# Patient Record
Sex: MALE | Race: WHITE | Employment: OTHER | URBAN - METROPOLITAN AREA
[De-identification: names, ages, dates, MRNs, and addresses within clinical notes are randomized per-mention and may not be internally consistent; named-entity substitution may affect disease eponyms.]

---

## 2023-02-24 PROBLEM — M48.061 LUMBAR STENOSIS: Status: ACTIVE | Noted: 2023-02-24

## 2023-02-24 PROBLEM — M47.816 LUMBAR SPONDYLOSIS: Status: ACTIVE | Noted: 2023-02-24

## 2023-02-24 PROBLEM — E78.5 HYPERLIPIDEMIA: Status: ACTIVE | Noted: 2023-02-24

## 2023-02-24 PROBLEM — I25.10 ARTERIOSCLEROTIC HEART DISEASE: Status: ACTIVE | Noted: 2023-02-24

## 2023-02-24 PROBLEM — M54.50 LUMBAR PAIN: Status: ACTIVE | Noted: 2023-02-24

## 2023-02-24 PROBLEM — H90.3 BILATERAL SENSORINEURAL HEARING LOSS: Status: ACTIVE | Noted: 2023-02-24

## 2023-02-24 PROBLEM — S32.000A LUMBAR COMPRESSION FRACTURE (MULTI): Status: ACTIVE | Noted: 2023-02-24

## 2023-02-24 PROBLEM — D12.6 TUBULAR ADENOMA OF COLON: Status: ACTIVE | Noted: 2023-02-24

## 2023-02-24 PROBLEM — H10.9 CONJUNCTIVITIS: Status: ACTIVE | Noted: 2023-02-24

## 2023-02-24 PROBLEM — N28.1 RENAL CYST: Status: ACTIVE | Noted: 2023-02-24

## 2023-02-24 PROBLEM — Z98.61 CAD S/P PERCUTANEOUS CORONARY ANGIOPLASTY: Status: ACTIVE | Noted: 2023-02-24

## 2023-02-24 PROBLEM — N40.1 BPH WITH OBSTRUCTION/LOWER URINARY TRACT SYMPTOMS: Status: ACTIVE | Noted: 2023-02-24

## 2023-02-24 PROBLEM — N13.8 BPH WITH OBSTRUCTION/LOWER URINARY TRACT SYMPTOMS: Status: ACTIVE | Noted: 2023-02-24

## 2023-02-24 PROBLEM — I65.29 CAROTID ARTERY PLAQUE: Status: ACTIVE | Noted: 2023-02-24

## 2023-02-24 PROBLEM — R42 ORTHOSTATIC DIZZINESS: Status: ACTIVE | Noted: 2023-02-24

## 2023-02-24 PROBLEM — M50.90 CERVICAL DISC DISEASE: Status: ACTIVE | Noted: 2023-02-24

## 2023-02-24 PROBLEM — I10 BENIGN ESSENTIAL HYPERTENSION: Status: ACTIVE | Noted: 2023-02-24

## 2023-02-24 PROBLEM — Z95.5 H/O HEART ARTERY STENT: Status: ACTIVE | Noted: 2023-02-24

## 2023-02-24 PROBLEM — Q44.1 GALLBLADDER ANOMALY: Status: ACTIVE | Noted: 2023-02-24

## 2023-02-24 PROBLEM — R29.898 LOWER EXTREMITY WEAKNESS: Status: ACTIVE | Noted: 2023-02-24

## 2023-02-24 PROBLEM — M81.0 OSTEOPOROSIS, SENILE: Status: ACTIVE | Noted: 2023-02-24

## 2023-02-24 PROBLEM — R53.1 LEFT-SIDED WEAKNESS: Status: ACTIVE | Noted: 2023-02-24

## 2023-02-24 PROBLEM — R91.1 LUNG NODULE: Status: ACTIVE | Noted: 2023-02-24

## 2023-02-24 PROBLEM — G45.9 TIA (TRANSIENT ISCHEMIC ATTACK): Status: ACTIVE | Noted: 2023-02-24

## 2023-02-24 PROBLEM — E66.3 OVERWEIGHT WITH BODY MASS INDEX (BMI) OF 26 TO 26.9 IN ADULT: Status: ACTIVE | Noted: 2023-02-24

## 2023-02-24 RX ORDER — ASPIRIN 81 MG/1
1 TABLET ORAL DAILY
COMMUNITY
Start: 2013-04-18

## 2023-02-24 RX ORDER — MULTIVIT-MIN/FA/LYCOPEN/LUTEIN .4-300-25
TABLET ORAL
COMMUNITY

## 2023-02-24 RX ORDER — ATORVASTATIN CALCIUM 40 MG/1
1 TABLET, FILM COATED ORAL NIGHTLY
COMMUNITY
Start: 2013-04-18 | End: 2023-06-06 | Stop reason: SDUPTHER

## 2023-03-29 ENCOUNTER — APPOINTMENT (OUTPATIENT)
Dept: PRIMARY CARE | Facility: CLINIC | Age: 88
End: 2023-03-29
Payer: MEDICARE

## 2023-03-30 ENCOUNTER — OFFICE VISIT (OUTPATIENT)
Dept: PRIMARY CARE | Facility: CLINIC | Age: 88
End: 2023-03-30
Payer: MEDICARE

## 2023-03-30 VITALS
HEART RATE: 67 BPM | OXYGEN SATURATION: 98 % | DIASTOLIC BLOOD PRESSURE: 70 MMHG | TEMPERATURE: 98.2 F | RESPIRATION RATE: 16 BRPM | SYSTOLIC BLOOD PRESSURE: 128 MMHG

## 2023-03-30 DIAGNOSIS — S32.000S COMPRESSION FRACTURE OF LUMBAR VERTEBRA, UNSPECIFIED LUMBAR VERTEBRAL LEVEL, SEQUELA: ICD-10-CM

## 2023-03-30 DIAGNOSIS — I10 BENIGN ESSENTIAL HYPERTENSION: ICD-10-CM

## 2023-03-30 DIAGNOSIS — E78.2 MIXED HYPERLIPIDEMIA: ICD-10-CM

## 2023-03-30 DIAGNOSIS — M48.061 SPINAL STENOSIS OF LUMBAR REGION, UNSPECIFIED WHETHER NEUROGENIC CLAUDICATION PRESENT: Primary | ICD-10-CM

## 2023-03-30 DIAGNOSIS — G45.9 TIA (TRANSIENT ISCHEMIC ATTACK): ICD-10-CM

## 2023-03-30 PROBLEM — M81.0 OSTEOPOROSIS, SENILE: Status: RESOLVED | Noted: 2023-02-24 | Resolved: 2023-03-30

## 2023-03-30 PROBLEM — H10.9 CONJUNCTIVITIS: Status: RESOLVED | Noted: 2023-02-24 | Resolved: 2023-03-30

## 2023-03-30 PROBLEM — R42 ORTHOSTATIC DIZZINESS: Status: RESOLVED | Noted: 2023-02-24 | Resolved: 2023-03-30

## 2023-03-30 PROCEDURE — 3078F DIAST BP <80 MM HG: CPT | Performed by: INTERNAL MEDICINE

## 2023-03-30 PROCEDURE — 1160F RVW MEDS BY RX/DR IN RCRD: CPT | Performed by: INTERNAL MEDICINE

## 2023-03-30 PROCEDURE — 1036F TOBACCO NON-USER: CPT | Performed by: INTERNAL MEDICINE

## 2023-03-30 PROCEDURE — 99214 OFFICE O/P EST MOD 30 MIN: CPT | Performed by: INTERNAL MEDICINE

## 2023-03-30 PROCEDURE — 1159F MED LIST DOCD IN RCRD: CPT | Performed by: INTERNAL MEDICINE

## 2023-03-30 PROCEDURE — 1170F FXNL STATUS ASSESSED: CPT | Performed by: INTERNAL MEDICINE

## 2023-03-30 PROCEDURE — 3074F SYST BP LT 130 MM HG: CPT | Performed by: INTERNAL MEDICINE

## 2023-03-30 ASSESSMENT — ENCOUNTER SYMPTOMS
ABDOMINAL PAIN: 0
WEAKNESS: 1
DIARRHEA: 0
BACK PAIN: 1
BLOOD IN STOOL: 0
CONSTIPATION: 1

## 2023-03-30 ASSESSMENT — ACTIVITIES OF DAILY LIVING (ADL)
DRESSING: INDEPENDENT
TAKING_MEDICATION: INDEPENDENT
MANAGING_FINANCES: INDEPENDENT
GROCERY_SHOPPING: NEEDS ASSISTANCE
DOING_HOUSEWORK: INDEPENDENT
BATHING: INDEPENDENT

## 2023-03-30 ASSESSMENT — PATIENT HEALTH QUESTIONNAIRE - PHQ9
1. LITTLE INTEREST OR PLEASURE IN DOING THINGS: NOT AT ALL
SUM OF ALL RESPONSES TO PHQ9 QUESTIONS 1 AND 2: 0
2. FEELING DOWN, DEPRESSED OR HOPELESS: NOT AT ALL

## 2023-03-30 NOTE — PROGRESS NOTES
Patient here for a Medicare Wellness visit and follow up    Subjective   Patient ID: Ant Seaman is a 88 y.o. male who presents for Medicare Annual Wellness Visit Subsequent and Follow-up.    The patient completed a MOHS procedure on 3/27/2023 and follows with  from [facility].     The patient reports ongoing back pain due to lumbar stenosis which is minimally to partially responsive to physical therapy.  He has declined surgical repair due to the extent of the procedure.  Although the pain is bothersome, he notes that the most limiting symptom is the associated lower limb weakness.  He is quite worried that he may eventually lose the use of his legs, and is considering the suggested corticosteroid injections in the lumbar area.  He uses a walker for ambulation and alternates Tylenol and Advil every 6 hours for pain control.  .    The patient notes some double vision following a TIA in 2018 which is well corrected with prescription lenses.  He follows with  from Ophthalmology, and recently underwent cataract surgery as well.      The patient has been taking laxatives over the counter since the fall of 2022 every two to three days for a bowel movement.  He denies any abdominal pain, hematochezia, or melena.  He uses a generic [employer] Stimulative Laxative.      The patient denies any chest pain or chest pressure.      Review of Systems   Eyes:  Positive for visual disturbance.   Cardiovascular:  Negative for chest pain.   Gastrointestinal:  Positive for constipation. Negative for abdominal pain, blood in stool and diarrhea.   Musculoskeletal:  Positive for back pain.   Neurological:  Positive for weakness.       Objective   Physical Exam  Constitutional:       Appearance: Normal appearance.   Cardiovascular:      Rate and Rhythm: Normal rate and regular rhythm.      Heart sounds: Normal heart sounds.   Pulmonary:      Effort: Pulmonary effort is normal.      Breath sounds: Normal breath  sounds.   Abdominal:      General: Bowel sounds are normal.      Palpations: Abdomen is soft.      Tenderness: There is no abdominal tenderness.   Skin:     General: Skin is warm and dry.   Neurological:      General: No focal deficit present.      Mental Status: He is alert and oriented to person, place, and time. Mental status is at baseline.   Psychiatric:         Mood and Affect: Mood normal.         Behavior: Behavior normal.       Assessment/Plan   Problem List Items Addressed This Visit          Nervous    Lumbar stenosis - Primary       Circulatory    Benign essential hypertension    TIA (transient ischemic attack)       Musculoskeletal    Lumbar compression fracture (CMS/HCC)       Other    Hyperlipidemia       Medicare Wellness Examination Done  -  Discussed healthy diet and regular exercise.    -  Physical exam overall unremarkable. Immunizations reviewed and updated accordingly. Healthy lifestyle choices discussed (tobacco avoidance, appropriate alcohol use, avoidance of illicit substances).   -  Patient is wearing seatbelt.   -  Screening lab work ordered as indicated.    -  Age appropriate screening tests reviewed with patient.       IMPRESSIONS/PLAN:     /70 in office today.     HLD   - Stable, continue on atorvastatin 40mg QD.     CAD   - s/p stent placement, following with Dr. Waddell in Cardiology, takes ASA 81mg QD.     Lumbar Spondylolisthesis   - Following with Dr. Rodriguez in Pain Medicine, s/p lumbar decompression under fluoroscopic guidance 8/18/2021.  Continue with Tylenol and Advil as directed, but drink plenty of water to mitigate adverse effects.       Coarsened echotecture of liver   - US of gallbladder 4/2021 showed coarsened nodule contour of liver. He does not with to work this up further.      Lung Nodule   - Chest CT stable 9/22 - 6mm subpleural right lower lobe nodule, calcified walls of gallbladder, and hiatal hernia. Will discuss repeat in September of 2023.     Health  Maintenance   - Routine labs 8/2022, will order repeat during next visit.     Follow up for regular wellness examinations, call sooner if needed.       Scribe Attestation  By signing my name below, I, [facility]   attest that this documentation has been prepared under the direction and in the presence of Edis Figueroa DO.

## 2023-04-03 DIAGNOSIS — M47.816 LUMBAR SPONDYLOSIS: Primary | ICD-10-CM

## 2023-05-02 ENCOUNTER — HOSPITAL ENCOUNTER (OUTPATIENT)
Dept: DATA CONVERSION | Facility: HOSPITAL | Age: 88
End: 2023-05-02
Attending: PAIN MEDICINE | Admitting: PAIN MEDICINE
Payer: MEDICARE

## 2023-05-02 DIAGNOSIS — M48.062 SPINAL STENOSIS, LUMBAR REGION WITH NEUROGENIC CLAUDICATION: ICD-10-CM

## 2023-06-06 DIAGNOSIS — E78.2 MIXED HYPERLIPIDEMIA: Primary | ICD-10-CM

## 2023-06-06 RX ORDER — ATORVASTATIN CALCIUM 40 MG/1
40 TABLET, FILM COATED ORAL NIGHTLY
Qty: 90 TABLET | Refills: 2 | Status: SHIPPED | OUTPATIENT
Start: 2023-06-06 | End: 2024-03-05 | Stop reason: SDUPTHER

## 2023-06-06 NOTE — TELEPHONE ENCOUNTER
Pt of Dr Figueroa.   Needs a refill.  Using [facility] [address] in [place]  Lipitor 40mg once daily

## 2023-08-01 ENCOUNTER — HOSPITAL ENCOUNTER (OUTPATIENT)
Dept: DATA CONVERSION | Facility: HOSPITAL | Age: 88
End: 2023-08-01
Attending: PAIN MEDICINE | Admitting: PAIN MEDICINE
Payer: MEDICARE

## 2023-08-01 DIAGNOSIS — M48.062 SPINAL STENOSIS, LUMBAR REGION WITH NEUROGENIC CLAUDICATION: ICD-10-CM

## 2023-09-22 ENCOUNTER — PATIENT MESSAGE (OUTPATIENT)
Dept: PRIMARY CARE | Facility: CLINIC | Age: 88
End: 2023-09-22
Payer: MEDICARE

## 2023-09-22 DIAGNOSIS — E78.2 MIXED HYPERLIPIDEMIA: Primary | ICD-10-CM

## 2023-09-28 ENCOUNTER — LAB (OUTPATIENT)
Dept: LAB | Facility: LAB | Age: 88
End: 2023-09-28
Payer: MEDICARE

## 2023-09-28 ENCOUNTER — OFFICE VISIT (OUTPATIENT)
Dept: PRIMARY CARE | Facility: CLINIC | Age: 88
End: 2023-09-28
Payer: MEDICARE

## 2023-09-28 VITALS
TEMPERATURE: 97.2 F | WEIGHT: 164 LBS | SYSTOLIC BLOOD PRESSURE: 148 MMHG | DIASTOLIC BLOOD PRESSURE: 80 MMHG | BODY MASS INDEX: 26.47 KG/M2 | RESPIRATION RATE: 16 BRPM | HEART RATE: 67 BPM | OXYGEN SATURATION: 97 %

## 2023-09-28 DIAGNOSIS — E78.2 MIXED HYPERLIPIDEMIA: ICD-10-CM

## 2023-09-28 DIAGNOSIS — R91.1 LUNG NODULE: ICD-10-CM

## 2023-09-28 DIAGNOSIS — Z23 ENCOUNTER FOR IMMUNIZATION: Primary | ICD-10-CM

## 2023-09-28 LAB
ALANINE AMINOTRANSFERASE (SGPT) (U/L) IN SER/PLAS: 18 U/L (ref 10–52)
ALBUMIN (G/DL) IN SER/PLAS: 4.1 G/DL (ref 3.4–5)
ALKALINE PHOSPHATASE (U/L) IN SER/PLAS: 75 U/L (ref 33–136)
ANION GAP IN SER/PLAS: 10 MMOL/L (ref 10–20)
ASPARTATE AMINOTRANSFERASE (SGOT) (U/L) IN SER/PLAS: 25 U/L (ref 9–39)
BASOPHILS (10*3/UL) IN BLOOD BY AUTOMATED COUNT: 0.05 X10E9/L (ref 0–0.1)
BASOPHILS/100 LEUKOCYTES IN BLOOD BY AUTOMATED COUNT: 1 % (ref 0–2)
BILIRUBIN TOTAL (MG/DL) IN SER/PLAS: 0.5 MG/DL (ref 0–1.2)
CALCIUM (MG/DL) IN SER/PLAS: 9 MG/DL (ref 8.6–10.3)
CARBON DIOXIDE, TOTAL (MMOL/L) IN SER/PLAS: 29 MMOL/L (ref 21–32)
CHLORIDE (MMOL/L) IN SER/PLAS: 103 MMOL/L (ref 98–107)
CHOLESTEROL (MG/DL) IN SER/PLAS: 104 MG/DL (ref 0–199)
CHOLESTEROL IN HDL (MG/DL) IN SER/PLAS: 70.7 MG/DL
CHOLESTEROL/HDL RATIO: 1.5
CREATININE (MG/DL) IN SER/PLAS: 0.97 MG/DL (ref 0.5–1.3)
EOSINOPHILS (10*3/UL) IN BLOOD BY AUTOMATED COUNT: 0.07 X10E9/L (ref 0–0.4)
EOSINOPHILS/100 LEUKOCYTES IN BLOOD BY AUTOMATED COUNT: 1.4 % (ref 0–6)
ERYTHROCYTE DISTRIBUTION WIDTH (RATIO) BY AUTOMATED COUNT: 12.7 % (ref 11.5–14.5)
ERYTHROCYTE MEAN CORPUSCULAR HEMOGLOBIN CONCENTRATION (G/DL) BY AUTOMATED: 32.8 G/DL (ref 32–36)
ERYTHROCYTE MEAN CORPUSCULAR VOLUME (FL) BY AUTOMATED COUNT: 101 FL (ref 80–100)
ERYTHROCYTES (10*6/UL) IN BLOOD BY AUTOMATED COUNT: 4.04 X10E12/L (ref 4.5–5.9)
GFR MALE: 74 ML/MIN/1.73M2
GLUCOSE (MG/DL) IN SER/PLAS: 89 MG/DL (ref 74–99)
HEMATOCRIT (%) IN BLOOD BY AUTOMATED COUNT: 40.8 % (ref 41–52)
HEMOGLOBIN (G/DL) IN BLOOD: 13.4 G/DL (ref 13.5–17.5)
IMMATURE GRANULOCYTES/100 LEUKOCYTES IN BLOOD BY AUTOMATED COUNT: 0.4 % (ref 0–0.9)
LDL: 22 MG/DL (ref 0–99)
LEUKOCYTES (10*3/UL) IN BLOOD BY AUTOMATED COUNT: 5.1 X10E9/L (ref 4.4–11.3)
LYMPHOCYTES (10*3/UL) IN BLOOD BY AUTOMATED COUNT: 0.96 X10E9/L (ref 0.8–3)
LYMPHOCYTES/100 LEUKOCYTES IN BLOOD BY AUTOMATED COUNT: 18.9 % (ref 13–44)
MONOCYTES (10*3/UL) IN BLOOD BY AUTOMATED COUNT: 0.75 X10E9/L (ref 0.05–0.8)
MONOCYTES/100 LEUKOCYTES IN BLOOD BY AUTOMATED COUNT: 14.8 % (ref 2–10)
NEUTROPHILS (10*3/UL) IN BLOOD BY AUTOMATED COUNT: 3.22 X10E9/L (ref 1.6–5.5)
NEUTROPHILS/100 LEUKOCYTES IN BLOOD BY AUTOMATED COUNT: 63.5 % (ref 40–80)
PLATELETS (10*3/UL) IN BLOOD AUTOMATED COUNT: 188 X10E9/L (ref 150–450)
POTASSIUM (MMOL/L) IN SER/PLAS: 4.3 MMOL/L (ref 3.5–5.3)
PROTEIN TOTAL: 6.5 G/DL (ref 6.4–8.2)
SODIUM (MMOL/L) IN SER/PLAS: 138 MMOL/L (ref 136–145)
TRIGLYCERIDE (MG/DL) IN SER/PLAS: 59 MG/DL (ref 0–149)
UREA NITROGEN (MG/DL) IN SER/PLAS: 20 MG/DL (ref 6–23)
VLDL: 12 MG/DL (ref 0–40)

## 2023-09-28 PROCEDURE — 1036F TOBACCO NON-USER: CPT | Performed by: INTERNAL MEDICINE

## 2023-09-28 PROCEDURE — 80061 LIPID PANEL: CPT

## 2023-09-28 PROCEDURE — 85025 COMPLETE CBC W/AUTO DIFF WBC: CPT

## 2023-09-28 PROCEDURE — 99214 OFFICE O/P EST MOD 30 MIN: CPT | Performed by: INTERNAL MEDICINE

## 2023-09-28 PROCEDURE — 80053 COMPREHEN METABOLIC PANEL: CPT

## 2023-09-28 PROCEDURE — 3079F DIAST BP 80-89 MM HG: CPT | Performed by: INTERNAL MEDICINE

## 2023-09-28 PROCEDURE — 36415 COLL VENOUS BLD VENIPUNCTURE: CPT

## 2023-09-28 PROCEDURE — G0008 ADMIN INFLUENZA VIRUS VAC: HCPCS | Performed by: INTERNAL MEDICINE

## 2023-09-28 PROCEDURE — 90662 IIV NO PRSV INCREASED AG IM: CPT | Performed by: INTERNAL MEDICINE

## 2023-09-28 PROCEDURE — 3077F SYST BP >= 140 MM HG: CPT | Performed by: INTERNAL MEDICINE

## 2023-09-28 PROCEDURE — 1159F MED LIST DOCD IN RCRD: CPT | Performed by: INTERNAL MEDICINE

## 2023-09-28 PROCEDURE — 1125F AMNT PAIN NOTED PAIN PRSNT: CPT | Performed by: INTERNAL MEDICINE

## 2023-09-28 PROCEDURE — 1160F RVW MEDS BY RX/DR IN RCRD: CPT | Performed by: INTERNAL MEDICINE

## 2023-09-28 ASSESSMENT — ENCOUNTER SYMPTOMS
DIARRHEA: 0
BACK PAIN: 1
SHORTNESS OF BREATH: 0
CONSTIPATION: 0

## 2023-09-28 NOTE — PROGRESS NOTES
Infl;uPatient here for a 6 month follow up    Subjective   Patient ID: Ant Seaman is a 89 y.o. male who presents for Follow-up.    The patient reports mild lower limb weakness that seems to subside over the day with activity.      The patient continues to follow with  from Pain Medicine for lumbar spondylolisthesis.  He received his last Lumbar epidural corticosteroid injection in 8/1/2023 and finds that relief wears off after 6 weeks.  He also has a history of lumbar compression fracture.    The patient previously followed with  from Cardiology for coronary artery disease.  He underwent cardiac catheterization in 8/2013, and reports that his condition is stable.  The patient is maintained with ASA 81 mg once daily and atorvastatin 40 mg once daily.  He denies any dyspnea, chest pain, or chest pressure.     The patient denies any bowel problems.      Review of Systems   Respiratory:  Negative for shortness of breath.    Cardiovascular:  Negative for chest pain.   Gastrointestinal:  Negative for constipation and diarrhea.   Musculoskeletal:  Positive for back pain.     Objective   Physical Exam  Constitutional:       Appearance: Normal appearance.   Neck:      Vascular: No carotid bruit.   Cardiovascular:      Rate and Rhythm: Normal rate and regular rhythm.      Heart sounds: Normal heart sounds.   Pulmonary:      Effort: Pulmonary effort is normal.      Breath sounds: Normal breath sounds.   Abdominal:      General: Bowel sounds are normal.      Palpations: Abdomen is soft.      Tenderness: There is no abdominal tenderness.   Skin:     General: Skin is warm and dry.   Neurological:      General: No focal deficit present.      Mental Status: He is alert and oriented to person, place, and time. Mental status is at baseline.   Psychiatric:         Mood and Affect: Mood normal.         Behavior: Behavior normal.       Assessment/Plan       IMPRESSIONS/PLAN:     Lung Nodule   - Chest CT  stable 9/22 - 6mm subpleural right lower lobe nodule, calcified walls of gallbladder, and hiatal hernia. Ordered repeat for 2023.    /80 in office today.     HLD   - Stable, continue on atorvastatin 40mg QD.     CAD   - s/p stent placement, following with Dr. Waddell in Cardiology, takes ASA 81mg QD.     Lumbar Spondylolisthesis   - Following with Dr. Rodriguez in Pain Medicine, s/p lumbar decompression under fluoroscopic guidance 8/18/2021.  Continue with Tylenol and Advil as directed, but drink plenty of water to mitigate adverse effects.       Coarsened echotecture of liver   - US of gallbladder 4/2021 showed coarsened nodule contour of liver. He does not wish to work this up further.      Health Maintenance   - Routine labs pending for 9/2023 including CBC, CMP, and a lipid panel to be completed in the fasting state. Advised patient to receive new Covid-19 booster through the pharmacy, and discussed adverse effects.  Okay to receive RSV as well if patient wishes, separately. Patient received high dose Influenza vaccine in the clinic today, tolerated well.      Follow up for regular wellness examinations, call sooner if needed.       Scribe Attestation  By signing my name below, I, [facility]   attest that this documentation has been prepared under the direction and in the presence of Edis Figueroa DO.

## 2023-10-01 NOTE — OP NOTE
PROCEDURE DETAILS    Preoperative Diagnosis:  Neurogenic claudication due to lumbar spinal stenosis, M48.062    Postoperative Diagnosis:  Neurogenic claudication due to lumbar spinal stenosis, M48.062    Surgeon: Katherine Rodriguez  Resident/Fellow/Other Assistant: None of these were associated with this case    Procedure:  Lumbar epidural steroid injection under fluoroscopic guidance     Anesthesia: No anesthesiologist associated with this case  Estimated Blood Loss: 0  Findings: None         Operative Report:       Operation  Midline lumbar epidural steroid injection. Level performed L5-S1    Surgical indications  The patient is here today to receive an epidural steroid injection to assist with pain.    Operative report  The patient was taken to the procedure room, was placed into a prone positioning. The lumbar area was prepped and draped sterilely in the normal fashion. Under fluoroscopic guidance the epidural space was identified. The skin and subcutaneous tissue was  anesthetized with 1% lidocaine. An 18-gauge Tuohy needle was introduced using the normal saline loss-of-resistance technique. Once the loss of resistance had been achieved, final positioning of the needle was confirmed with negative aspiration of heme  and CSF, with the injection of contrast material showing spread in the epidural space, confirmed in AP and lateral view. Then the patient received 80 mg of Depo-Medrol diluted into normal saline preservative-free and 2 mL of 0.25% bupivacaine making total  volume of 8 mL. The patient tolerated the procedure well, was taken to the recovery room, allowed to recover sufficient amount of time and then was discharged home in stable condition. The patient was advised to followup with the [facility]  to reassess improvement on as-needed basis.    Thank you for allowing me to participate in the care of this patient.                        Attestation:   Note Completion:  Attending  Attestation I performed the procedure without a resident         Electronic Signatures:  Katherine Rodriguez)  (Signed 01-Aug-2023 11:39)   Authored: Post-Operative Note, Chart Review, Note Completion      Last Updated: 01-Aug-2023 11:39 by Katherine Rodriguez)

## 2023-10-02 NOTE — OP NOTE
PROCEDURE DETAILS    Preoperative Diagnosis:  Neurogenic claudication due to lumbar spinal stenosis, M48.062    Postoperative Diagnosis:  Neurogenic claudication due to lumbar spinal stenosis, M48.062    Surgeon: Katherine Rodriguez  Resident/Fellow/Other Assistant: Yuki Sapp    Procedure:  Lumbar epidural steroid injection under fluoroscopic guidance  L4-5    Anesthesia: No anesthesiologist associated with this case  Estimated Blood Loss: 0  Findings: None         Operative Report:       Operation  Midline lumbar epidural steroid injection. Level performed L4-L5    Surgical indications  The patient is here today to receive an epidural steroid injection to assist with pain.    Operative report  The patient was taken to the procedure room, was placed into a prone positioning. The lumbar area was prepped and draped sterilely in the normal fashion. Under fluoroscopic guidance the epidural space was identified. The skin and subcutaneous tissue was  anesthetized with 1% lidocaine. An 18-gauge Tuohy needle was introduced using the normal saline loss-of-resistance technique. Once the loss of resistance had been achieved, final positioning of the needle was confirmed with negative aspiration of heme  and CSF, with the injection of contrast material showing spread in the epidural space, confirmed in AP and lateral view. Then the patient received 80 mg of Depo-Medrol diluted into normal saline preservative-free and 2 mL of 0.25% bupivacaine making total  volume of 8 mL. The patient tolerated the procedure well, was taken to the recovery room, allowed to recover sufficient amount of time and then was discharged home in stable condition. The patient was advised to followup with the [facility]  to reassess improvement on as-needed basis.    Thank you for allowing me to participate in the care of this patient.                        Attestation:   Note Completion:  Attending Attestation I performed the  procedure without a resident         Electronic Signatures:  Katherine Rodriguez)  (Signed 02-May-2023 10:34)   Authored: Post-Operative Note, Chart Review, Note Completion      Last Updated: 02-May-2023 10:34 by Katherine Rodriguez)

## 2023-10-18 ENCOUNTER — ANCILLARY PROCEDURE (OUTPATIENT)
Dept: RADIOLOGY | Facility: CLINIC | Age: 88
End: 2023-10-18
Payer: MEDICARE

## 2023-10-18 DIAGNOSIS — R91.1 LUNG NODULE: ICD-10-CM

## 2023-10-18 PROCEDURE — 71250 CT THORAX DX C-: CPT | Mod: MG

## 2023-10-18 PROCEDURE — 71250 CT THORAX DX C-: CPT | Performed by: RADIOLOGY

## 2023-11-20 DIAGNOSIS — M48.062 SPINAL STENOSIS OF LUMBAR REGION WITH NEUROGENIC CLAUDICATION: Primary | ICD-10-CM

## 2024-01-04 ENCOUNTER — HOSPITAL ENCOUNTER (OUTPATIENT)
Dept: RADIOLOGY | Facility: HOSPITAL | Age: 89
Discharge: HOME | End: 2024-01-04
Payer: MEDICARE

## 2024-01-04 ENCOUNTER — HOSPITAL ENCOUNTER (OUTPATIENT)
Dept: PAIN MEDICINE | Facility: CLINIC | Age: 89
Discharge: HOME | End: 2024-01-04
Payer: MEDICARE

## 2024-01-04 VITALS
TEMPERATURE: 97.2 F | HEART RATE: 57 BPM | SYSTOLIC BLOOD PRESSURE: 196 MMHG | DIASTOLIC BLOOD PRESSURE: 89 MMHG | OXYGEN SATURATION: 97 % | RESPIRATION RATE: 20 BRPM

## 2024-01-04 DIAGNOSIS — M48.062 SPINAL STENOSIS OF LUMBAR REGION WITH NEUROGENIC CLAUDICATION: ICD-10-CM

## 2024-01-04 PROCEDURE — 62323 NJX INTERLAMINAR LMBR/SAC: CPT | Performed by: PAIN MEDICINE

## 2024-01-04 PROCEDURE — 2500000004 HC RX 250 GENERAL PHARMACY W/ HCPCS (ALT 636 FOR OP/ED): Mod: JW

## 2024-01-04 PROCEDURE — 76000 FLUOROSCOPY <1 HR PHYS/QHP: CPT

## 2024-01-04 PROCEDURE — 2550000001 HC RX 255 CONTRASTS: Performed by: PAIN MEDICINE

## 2024-01-04 PROCEDURE — 2500000004 HC RX 250 GENERAL PHARMACY W/ HCPCS (ALT 636 FOR OP/ED): Performed by: PAIN MEDICINE

## 2024-01-04 RX ORDER — METHYLPREDNISOLONE ACETATE 40 MG/ML
80 INJECTION, SUSPENSION INTRA-ARTICULAR; INTRALESIONAL; INTRAMUSCULAR; SOFT TISSUE ONCE
Status: COMPLETED | OUTPATIENT
Start: 2024-01-04 | End: 2024-01-04

## 2024-01-04 RX ORDER — BUPIVACAINE HYDROCHLORIDE 2.5 MG/ML
5 INJECTION, SOLUTION EPIDURAL; INFILTRATION; INTRACAUDAL ONCE
Status: COMPLETED | OUTPATIENT
Start: 2024-01-04 | End: 2024-01-04

## 2024-01-04 RX ADMIN — BUPIVACAINE HYDROCHLORIDE 12.5 MG: 2.5 INJECTION, SOLUTION EPIDURAL; INFILTRATION; INTRACAUDAL; PERINEURAL at 10:44

## 2024-01-04 RX ADMIN — IOHEXOL 10 ML: 300 INJECTION, SOLUTION INTRAVENOUS at 10:45

## 2024-01-04 RX ADMIN — METHYLPREDNISOLONE ACETATE 80 MG: 40 INJECTION, SUSPENSION INTRALESIONAL; INTRAMUSCULAR; INTRASYNOVIAL; SOFT TISSUE at 10:44

## 2024-01-04 ASSESSMENT — PAIN DESCRIPTION - DESCRIPTORS: DESCRIPTORS: ACHING

## 2024-01-04 ASSESSMENT — PAIN SCALES - GENERAL: PAINLEVEL_OUTOF10: 5 - MODERATE PAIN

## 2024-01-04 ASSESSMENT — PAIN - FUNCTIONAL ASSESSMENT: PAIN_FUNCTIONAL_ASSESSMENT: 0-10

## 2024-01-04 NOTE — OP NOTE
Operation  Midline lumbar epidural steroid injection. Level performed L5-S1    Surgical indications  The patient is here today to receive an epidural steroid injection to assist with pain.    Operative report  The patient was taken to the procedure room, was placed into a prone positioning. The lumbar area was prepped and draped sterilely in the normal fashion. Under fluoroscopic guidance the epidural space was identified. The skin and subcutaneous tissue was anesthetized with 1% lidocaine. An 18-gauge Tuohy needle was introduced using the normal saline loss-of-resistance technique. Once the loss of resistance had been achieved, final positioning of the needle was confirmed with negative aspiration of heme and CSF, with the injection of contrast material showing spread in the epidural space, confirmed in AP and lateral view. Then the patient received 80 mg of Depo-Medrol diluted into normal saline preservative-free and 2 mL of 0.25% bupivacaine making total volume of 8 mL. The patient tolerated the procedure well, was taken to the recovery room, allowed to recover sufficient amount of time and then was discharged home in stable condition. The patient was advised to followup with the [facility] to reassess improvement on as-needed basis.    Thank you for allowing me to participate in the care of this patient.    Katherine Rodriguez MD  Medical director of [facility]   10:47 AM  01/04/24

## 2024-01-04 NOTE — DISCHARGE INSTRUCTIONS
Post-injection instructions:    Your pain may not be gone immediately after the procedure--it usually takes the steroid 3-5 days to start working.   It may take several weeks for the medicine to reach its' full effect.   Pay attention to how much pain relief (what percentage compared to before the procedure) you get and for how long it lasts.     Activity: Avoid strenuous activity for 24 hours. After that return to your normal activity level.     Bandages: Remove after 24 hours     Showering/Bathing: You may shower after bandage is removed     Follow up: CALL OFFICE IN 7 DAYS [phone] LEAVE MESSAGE ABOUT THE RELIEF THAT WAS OBTAINED      Call the doctor immediately: if you notice:     Excessive bleeding from procedure site (brisk bright red bleeding from the site or bleeding that soaks the bandages or does not stop)   Severe headache  Inability to walk, leg or arm weakness or numbness that is worse after the procedure   Uncontrolled pain   New urinary or fecal incontinence   Signs of infection: Fever above 101.5F, redness, swelling, pus or drainage from the site    Epidural Injection    Why is this procedure done?  With an epidural injection, the doctor injects drugs deep into the area around your spinal cord. This is different than epidural anesthesia that is used for surgery or when a woman has a baby. Your spine is a group of bones in your back that protect the nerves in your spinal cord. Problems with your spine can cause swollen nerves in the spinal cord. This swelling leads to pain and can limit movement. In an epidural injection, the doctor may give you a drug to help with swelling and pain.  You may have an epidural injection in different parts of your back, based on where your pain is. For pain in your head or arms, you may have a cervical epidural injection. If your pain is in your upper or middle back, you may get a thoracic epidural injection. For pain in your lower back or legs, you may get a  lumbar epidural injection.    What will the results be?  The treatment may:  Lower pain  Reduce swelling in the nerves  Improve movement  What happens before the procedure?  Your doctor will take your history. Talk to the doctor about:  All the drugs you are taking. Be sure to include all prescription and over-the-counter (OTC) drugs, and herbal supplements. Tell the doctor about any drug allergy. Bring a list of drugs you take with you.  If you have high blood sugar or diabetes. Your drugs may need to be changed.  Any bleeding problems. Be sure to tell your doctor if you are taking any drugs that may cause bleeding. Some of these are warfarin, rivaroxaban, apixaban, ticagrelor, clopidogrel, ketorolac, ibuprofen, naproxen, or aspirin. Certain vitamins and herbs, such as garlic and fish oil, may also add to the risk for bleeding. You may need to stop these drugs as well. Talk to your doctor about them.  Tell the doctor if you are pregnant.  You will not be allowed to drive right away after the procedure. Ask a family member or a friend to drive you home.  What happens during the procedure?  To help the doctor make sure the drugs are being injected in the right place, your doctor may do an x-ray of your spine. Other times your doctor may do a continuous x-ray during the procedure. This is a fluoroscopy. The doctor may also use a colored dye or a contrast dye to check where to inject the drug.  You may be given a drug to help you relax. You may be given a drug to make the area of the injection numb.  The doctor will clean the skin on your back or neck. This helps prevent infection.  The doctor will put a needle through the skin toward your spine. The drug will be injected into a space near the spine.  The needle will be taken out and a bandage will be placed over the injection site.  What happens after the procedure?  Staff will check on you to make sure you are doing well. They will tell you when you can go  home.  What care is needed at home?  Relax on the day of the injection.  Do not drive or run machines for at least 12 hours afterwards.  Apply ice to the injection site. Place an ice pack or a bag of frozen peas wrapped in a towel over the painful part. Never put ice right on the skin. Do not leave the ice on more than 10 to 15 minutes at a time.  It may take a few days before you will feel the effects of the injection.  What follow-up care is needed?  Your doctor may ask you to make visits to the office to check on your progress. Be sure to keep these visits. You may also need to see a physical therapist (PT). The PT will teach you exercises to help you get back your strength and motion. Ask your doctor when you can exercise.  What problems could happen?  Bleeding  Infection (rare)  Headache  Nerve injury  If you have diabetes, your blood sugar can go up after the injection. Check with your doctor if you need more treatment for this.  Last Reviewed Date

## 2024-01-16 ENCOUNTER — APPOINTMENT (OUTPATIENT)
Dept: PAIN MEDICINE | Facility: CLINIC | Age: 89
End: 2024-01-16
Payer: MEDICARE

## 2024-03-05 DIAGNOSIS — E78.2 MIXED HYPERLIPIDEMIA: ICD-10-CM

## 2024-03-05 RX ORDER — ATORVASTATIN CALCIUM 40 MG/1
40 TABLET, FILM COATED ORAL NIGHTLY
Qty: 90 TABLET | Refills: 2 | Status: SHIPPED | OUTPATIENT
Start: 2024-03-05

## 2024-03-25 ENCOUNTER — APPOINTMENT (OUTPATIENT)
Dept: PAIN MEDICINE | Facility: CLINIC | Age: 89
End: 2024-03-25
Payer: MEDICARE

## 2024-03-27 DIAGNOSIS — M48.062 SPINAL STENOSIS OF LUMBAR REGION WITH NEUROGENIC CLAUDICATION: Primary | ICD-10-CM

## 2024-03-28 ENCOUNTER — OFFICE VISIT (OUTPATIENT)
Dept: PRIMARY CARE | Facility: CLINIC | Age: 89
End: 2024-03-28
Payer: MEDICARE

## 2024-03-28 VITALS
TEMPERATURE: 98 F | OXYGEN SATURATION: 95 % | DIASTOLIC BLOOD PRESSURE: 80 MMHG | SYSTOLIC BLOOD PRESSURE: 148 MMHG | RESPIRATION RATE: 16 BRPM | HEART RATE: 66 BPM

## 2024-03-28 DIAGNOSIS — E78.2 MIXED HYPERLIPIDEMIA: ICD-10-CM

## 2024-03-28 DIAGNOSIS — G83.10 PARESIS OF LOWER EXTREMITY (MULTI): ICD-10-CM

## 2024-03-28 DIAGNOSIS — M54.50 LUMBAR PAIN: ICD-10-CM

## 2024-03-28 DIAGNOSIS — I10 BENIGN ESSENTIAL HYPERTENSION: Primary | ICD-10-CM

## 2024-03-28 PROCEDURE — 99214 OFFICE O/P EST MOD 30 MIN: CPT | Performed by: INTERNAL MEDICINE

## 2024-03-28 PROCEDURE — 3077F SYST BP >= 140 MM HG: CPT | Performed by: INTERNAL MEDICINE

## 2024-03-28 PROCEDURE — 1159F MED LIST DOCD IN RCRD: CPT | Performed by: INTERNAL MEDICINE

## 2024-03-28 PROCEDURE — 3079F DIAST BP 80-89 MM HG: CPT | Performed by: INTERNAL MEDICINE

## 2024-03-28 PROCEDURE — 1170F FXNL STATUS ASSESSED: CPT | Performed by: INTERNAL MEDICINE

## 2024-03-28 PROCEDURE — 1160F RVW MEDS BY RX/DR IN RCRD: CPT | Performed by: INTERNAL MEDICINE

## 2024-03-28 PROCEDURE — 1036F TOBACCO NON-USER: CPT | Performed by: INTERNAL MEDICINE

## 2024-03-28 PROCEDURE — G0439 PPPS, SUBSEQ VISIT: HCPCS | Performed by: INTERNAL MEDICINE

## 2024-03-28 PROCEDURE — 93000 ELECTROCARDIOGRAM COMPLETE: CPT | Performed by: INTERNAL MEDICINE

## 2024-03-28 ASSESSMENT — ENCOUNTER SYMPTOMS
BACK PAIN: 1
BLOOD IN STOOL: 0
CONSTIPATION: 0
DIARRHEA: 0
FREQUENCY: 0
NUMBNESS: 1
ABDOMINAL PAIN: 0
SHORTNESS OF BREATH: 0

## 2024-03-28 ASSESSMENT — PATIENT HEALTH QUESTIONNAIRE - PHQ9
SUM OF ALL RESPONSES TO PHQ9 QUESTIONS 1 AND 2: 0
2. FEELING DOWN, DEPRESSED OR HOPELESS: NOT AT ALL
1. LITTLE INTEREST OR PLEASURE IN DOING THINGS: NOT AT ALL

## 2024-03-28 ASSESSMENT — ACTIVITIES OF DAILY LIVING (ADL)
GROCERY_SHOPPING: NEEDS ASSISTANCE
MANAGING_FINANCES: INDEPENDENT
DRESSING: INDEPENDENT
DOING_HOUSEWORK: INDEPENDENT
BATHING: INDEPENDENT
TAKING_MEDICATION: INDEPENDENT

## 2024-03-28 NOTE — PROGRESS NOTES
"Patient here for a medicare wellness visit and follow up    Subjective   Patient ID: Ant Seaman is a 89 y.o. male who presents for Medicare Annual Wellness Visit Subsequent and Follow-up.    The patient has undergone three midline lumbar epidural steroid injections to date for back pain associated with lumbar spondylosis  He notes equivocal results varying from no relief to relief for up to 6 weeks.  The patient continues to follow with  from Pain Management, and is scheduled for an upcoming appointment on 4/1/2024.  He is concerned about further anesthesia in any future procedures, though he has tolerated it well thus far.    The patient recalls an elevated blood pressure of 196/89 mmHg while at the Pain Management clinic in 1/2024.  He denies any dyspnea, chest pain or chest pressure.      The patient states that nerve impingement in the cervical spine is slowly worsening with time.  He finds himself dropping items, and finds that paresthesia in the upper extremities is increasing.    The patient will be undergoing the fourth skin lesion removal in 4/2024.  He states that the latest lesion is near the eye and is thought to be basal cell carcinoma.    The patient mentions bilateral back pain in the lumbar region.      The patient has been using a \"mild stimulant laxative\" once every three days since 2023.  He finds that this regimen is working well for him.  The patient denies any abdominal bloating, hematochezia, melena, or bowel problems.     The patient mentions some nocturia of two times per evening.  He denies any other urinary symptoms.    The patient reports a small swelling in the lower extremity that seems to have become smaller since onset around 3/14/2024.  Although it was initially tender, the patient denies any current pain.  He denies any trauma to the area.     The patient wears a hearing aid device that is working well.  He denies any vision changes, and wears prescription " lenses.    The patient lives at home with his wife of 66 years.       Review of Systems   Eyes:  Negative for visual disturbance.   Respiratory:  Negative for shortness of breath.    Cardiovascular:  Negative for chest pain.   Gastrointestinal:  Negative for abdominal pain, blood in stool, constipation and diarrhea.   Genitourinary:  Negative for frequency.        Positive for nocturia of two times per evening.   Musculoskeletal:  Positive for back pain.   Skin:         Positive for skin lesion, and swelling in lower extremity.   Neurological:  Positive for numbness.       Objective   Physical Exam  Constitutional:       Appearance: Normal appearance.   Neck:      Vascular: No carotid bruit.   Cardiovascular:      Rate and Rhythm: Normal rate and regular rhythm.      Heart sounds: Normal heart sounds.   Pulmonary:      Effort: Pulmonary effort is normal.      Breath sounds: Normal breath sounds.   Abdominal:      General: Bowel sounds are normal.      Palpations: Abdomen is soft.      Tenderness: There is no abdominal tenderness.   Skin:     General: Skin is warm and dry.   Neurological:      General: No focal deficit present.      Mental Status: He is alert and oriented to person, place, and time. Mental status is at baseline.   Psychiatric:         Mood and Affect: Mood normal.         Behavior: Behavior normal.         Assessment/Plan   Problem List Items Addressed This Visit             ICD-10-CM    Benign essential hypertension - Primary I10    Relevant Orders    ECG 12 lead (Clinic Performed) (Completed)    Lipid panel    Comprehensive metabolic panel    CBC and Auto Differential    Hyperlipidemia E78.5    Lumbar pain M54.50    Paresis of lower extremity (CMS/[facility]) G83.10       Medicare Wellness Examination Done  -  Discussed healthy diet and regular exercise.    -  Physical exam overall unremarkable. Immunizations reviewed and updated accordingly. Healthy lifestyle choices discussed (tobacco avoidance,  appropriate alcohol use, avoidance of illicit substances).   -  Patient is wearing seatbelt.   -  Screening lab work ordered as indicated.    -  Age appropriate screening tests reviewed with patient.       EKG unremarkable compared to previous.    IMPRESSIONS/PLAN:     Painless Nodule of RLE, lateral  - No signs of inflammation.  Decreasing in size per patient.  Monitor for now.  Call the clinic if symptoms persist or worsen, and will order U/S.     /80 in office today.     HLD   - Stable, continue on atorvastatin 40mg QD.     CAD   - s/p stent placement, following with Dr. Waddell in Cardiology, takes ASA 81mg QD.     Lumbar Spondylolisthesis   - Following with Dr. Rodriguez in Pain Medicine, s/p lumbar decompression under fluoroscopic guidance 8/18/2021.  Continue with Tylenol and Advil as directed, but drink plenty of water to mitigate adverse effects.       Coarsened echotecture of liver   - US of gallbladder 4/2021 showed coarsened nodule contour of liver. He does not wish to work this up further.      Lung Nodule   - Chest CT stable 9/22 - 6mm subpleural right lower lobe nodule, calcified walls of gallbladder, and hiatal hernia. 10/2023 repeat showed Interval development of mild tree-in-bud reticulonodular opacities in  the left upper lobe, nonspecific, but most likely related to infectious or inflammatory bronchiolitis. Stable 6 mm subpleural nodule in the right lower lobe.    Health Maintenance   - Routine labs ordered including CBC, CMP, and a lipid panel to be completed in the fasting state.      Follow up for regular wellness examinations, call sooner if needed.       Scribe Attestation  By signing my name below, I, [facility]   attest that this documentation has been prepared under the direction and in the presence of Edis Figueroa DO.   Viridiana Pratt 03/28/24 9:23 AM

## 2024-03-29 ENCOUNTER — TELEPHONE (OUTPATIENT)
Dept: PAIN MEDICINE | Facility: CLINIC | Age: 89
End: 2024-03-29
Payer: MEDICARE

## 2024-04-01 ENCOUNTER — LAB (OUTPATIENT)
Dept: LAB | Facility: LAB | Age: 89
End: 2024-04-01
Payer: MEDICARE

## 2024-04-01 DIAGNOSIS — I10 BENIGN ESSENTIAL HYPERTENSION: ICD-10-CM

## 2024-04-01 LAB
ALBUMIN SERPL BCP-MCNC: 4.1 G/DL (ref 3.4–5)
ALP SERPL-CCNC: 79 U/L (ref 33–136)
ALT SERPL W P-5'-P-CCNC: 24 U/L (ref 10–52)
ANION GAP SERPL CALC-SCNC: 13 MMOL/L (ref 10–20)
AST SERPL W P-5'-P-CCNC: 28 U/L (ref 9–39)
BASOPHILS # BLD AUTO: 0.05 X10*3/UL (ref 0–0.1)
BASOPHILS NFR BLD AUTO: 1 %
BILIRUB SERPL-MCNC: 0.7 MG/DL (ref 0–1.2)
BUN SERPL-MCNC: 16 MG/DL (ref 6–23)
CALCIUM SERPL-MCNC: 9 MG/DL (ref 8.6–10.6)
CHLORIDE SERPL-SCNC: 103 MMOL/L (ref 98–107)
CHOLEST SERPL-MCNC: 108 MG/DL (ref 0–199)
CHOLESTEROL/HDL RATIO: 1.6
CO2 SERPL-SCNC: 28 MMOL/L (ref 21–32)
CREAT SERPL-MCNC: 0.79 MG/DL (ref 0.5–1.3)
EGFRCR SERPLBLD CKD-EPI 2021: 85 ML/MIN/1.73M*2
EOSINOPHIL # BLD AUTO: 0.06 X10*3/UL (ref 0–0.4)
EOSINOPHIL NFR BLD AUTO: 1.3 %
ERYTHROCYTE [DISTWIDTH] IN BLOOD BY AUTOMATED COUNT: 12.7 % (ref 11.5–14.5)
GLUCOSE SERPL-MCNC: 94 MG/DL (ref 74–99)
HCT VFR BLD AUTO: 40.7 % (ref 41–52)
HDLC SERPL-MCNC: 67.8 MG/DL
HGB BLD-MCNC: 13.5 G/DL (ref 13.5–17.5)
IMM GRANULOCYTES # BLD AUTO: 0.01 X10*3/UL (ref 0–0.5)
IMM GRANULOCYTES NFR BLD AUTO: 0.2 % (ref 0–0.9)
LDLC SERPL CALC-MCNC: 24 MG/DL
LYMPHOCYTES # BLD AUTO: 0.86 X10*3/UL (ref 0.8–3)
LYMPHOCYTES NFR BLD AUTO: 18 %
MCH RBC QN AUTO: 32.5 PG (ref 26–34)
MCHC RBC AUTO-ENTMCNC: 33.2 G/DL (ref 32–36)
MCV RBC AUTO: 98 FL (ref 80–100)
MONOCYTES # BLD AUTO: 0.65 X10*3/UL (ref 0.05–0.8)
MONOCYTES NFR BLD AUTO: 13.6 %
NEUTROPHILS # BLD AUTO: 3.14 X10*3/UL (ref 1.6–5.5)
NEUTROPHILS NFR BLD AUTO: 65.9 %
NON HDL CHOLESTEROL: 40 MG/DL (ref 0–149)
NRBC BLD-RTO: 0 /100 WBCS (ref 0–0)
PLATELET # BLD AUTO: 198 X10*3/UL (ref 150–450)
POTASSIUM SERPL-SCNC: 4.2 MMOL/L (ref 3.5–5.3)
PROT SERPL-MCNC: 6.2 G/DL (ref 6.4–8.2)
RBC # BLD AUTO: 4.16 X10*6/UL (ref 4.5–5.9)
SODIUM SERPL-SCNC: 140 MMOL/L (ref 136–145)
TRIGL SERPL-MCNC: 80 MG/DL (ref 0–149)
VLDL: 16 MG/DL (ref 0–40)
WBC # BLD AUTO: 4.8 X10*3/UL (ref 4.4–11.3)

## 2024-04-01 PROCEDURE — 80061 LIPID PANEL: CPT

## 2024-04-01 PROCEDURE — 80053 COMPREHEN METABOLIC PANEL: CPT

## 2024-04-01 PROCEDURE — 36415 COLL VENOUS BLD VENIPUNCTURE: CPT

## 2024-04-01 PROCEDURE — 85025 COMPLETE CBC W/AUTO DIFF WBC: CPT

## 2024-05-09 ENCOUNTER — HOSPITAL ENCOUNTER (OUTPATIENT)
Dept: RADIOLOGY | Facility: HOSPITAL | Age: 89
Discharge: HOME | End: 2024-05-09
Payer: MEDICARE

## 2024-05-09 ENCOUNTER — HOSPITAL ENCOUNTER (OUTPATIENT)
Dept: PAIN MEDICINE | Facility: CLINIC | Age: 89
Discharge: HOME | End: 2024-05-09
Payer: MEDICARE

## 2024-05-09 VITALS
OXYGEN SATURATION: 97 % | DIASTOLIC BLOOD PRESSURE: 87 MMHG | SYSTOLIC BLOOD PRESSURE: 188 MMHG | RESPIRATION RATE: 18 BRPM | HEART RATE: 55 BPM | TEMPERATURE: 97.2 F

## 2024-05-09 DIAGNOSIS — M48.062 SPINAL STENOSIS OF LUMBAR REGION WITH NEUROGENIC CLAUDICATION: ICD-10-CM

## 2024-05-09 PROCEDURE — 7100000010 HC PHASE TWO TIME - EACH INCREMENTAL 1 MINUTE: Performed by: PAIN MEDICINE

## 2024-05-09 PROCEDURE — 2500000004 HC RX 250 GENERAL PHARMACY W/ HCPCS (ALT 636 FOR OP/ED): Mod: JW | Performed by: PAIN MEDICINE

## 2024-05-09 PROCEDURE — 7100000009 HC PHASE TWO TIME - INITIAL BASE CHARGE: Performed by: PAIN MEDICINE

## 2024-05-09 PROCEDURE — A4649 SURGICAL SUPPLIES: HCPCS | Performed by: PAIN MEDICINE

## 2024-05-09 PROCEDURE — 96372 THER/PROPH/DIAG INJ SC/IM: CPT | Performed by: PAIN MEDICINE

## 2024-05-09 PROCEDURE — 62323 NJX INTERLAMINAR LMBR/SAC: CPT | Performed by: PAIN MEDICINE

## 2024-05-09 PROCEDURE — 2550000001 HC RX 255 CONTRASTS: Performed by: PAIN MEDICINE

## 2024-05-09 RX ORDER — BUPIVACAINE HYDROCHLORIDE 2.5 MG/ML
5 INJECTION, SOLUTION EPIDURAL; INFILTRATION; INTRACAUDAL ONCE
Status: COMPLETED | OUTPATIENT
Start: 2024-05-09 | End: 2024-05-09

## 2024-05-09 RX ORDER — METHYLPREDNISOLONE ACETATE 40 MG/ML
80 INJECTION, SUSPENSION INTRA-ARTICULAR; INTRALESIONAL; INTRAMUSCULAR; SOFT TISSUE ONCE
Status: COMPLETED | OUTPATIENT
Start: 2024-05-09 | End: 2024-05-09

## 2024-05-09 RX ADMIN — IOHEXOL 10 ML: 300 INJECTION, SOLUTION INTRAVENOUS at 09:54

## 2024-05-09 RX ADMIN — BUPIVACAINE HYDROCHLORIDE 12.5 MG: 2.5 INJECTION, SOLUTION EPIDURAL; INFILTRATION; INTRACAUDAL; PERINEURAL at 09:55

## 2024-05-09 RX ADMIN — METHYLPREDNISOLONE ACETATE 80 MG: 40 INJECTION, SUSPENSION INTRALESIONAL; INTRAMUSCULAR; INTRASYNOVIAL; SOFT TISSUE at 09:55

## 2024-05-09 ASSESSMENT — PAIN - FUNCTIONAL ASSESSMENT: PAIN_FUNCTIONAL_ASSESSMENT: 0-10

## 2024-05-09 ASSESSMENT — COLUMBIA-SUICIDE SEVERITY RATING SCALE - C-SSRS
1. IN THE PAST MONTH, HAVE YOU WISHED YOU WERE DEAD OR WISHED YOU COULD GO TO SLEEP AND NOT WAKE UP?: NO
6. HAVE YOU EVER DONE ANYTHING, STARTED TO DO ANYTHING, OR PREPARED TO DO ANYTHING TO END YOUR LIFE?: NO
2. HAVE YOU ACTUALLY HAD ANY THOUGHTS OF KILLING YOURSELF?: NO

## 2024-05-09 ASSESSMENT — PAIN SCALES - GENERAL: PAINLEVEL_OUTOF10: 7

## 2024-05-09 NOTE — DISCHARGE INSTRUCTIONS
Post-injection instructions:    Your pain may not be gone immediately after the procedure--it usually takes the steroid 3-5 days to start working.   It may take several weeks for the medicine to reach its' full effect.   Pay attention to how much pain relief (what percentage compared to before the procedure) you get and for how long it lasts.     Activity: Avoid strenuous activity for 24 hours. After that return to your normal activity level.     Bandages: Remove after 24 hours     Showering/Bathing: You may shower after bandage is removed     Follow up: CALL OFFICE IN 7 DAYS [phone] LEAVE MESSAGE ABOUT THE RELIEF THAT WAS OBTAINED      Call the doctor immediately: if you notice:     Excessive bleeding from procedure site (brisk bright red bleeding from the site or bleeding that soaks the bandages or does not stop)   Severe headache  Inability to walk, leg or arm weakness or numbness that is worse after the procedure   Uncontrolled pain   New urinary or fecal incontinence   Signs of infection: Fever above 101.5F, redness, swelling, pus or drainage from the site    Epidural Injection    Why is this procedure done?  With an epidural injection, the doctor injects drugs deep into the area around your spinal cord. This is different than epidural anesthesia that is used for surgery or when a woman has a baby. Your spine is a group of bones in your back that protect the nerves in your spinal cord. Problems with your spine can cause swollen nerves in the spinal cord. This swelling leads to pain and can limit movement. In an epidural injection, the doctor may give you a drug to help with swelling and pain.  You may have an epidural injection in different parts of your back, based on where your pain is. For pain in your head or arms, you may have a cervical epidural injection. If your pain is in your upper or middle back, you may get a thoracic epidural injection. For pain in your lower back or legs, you may get a  lumbar epidural injection.    What will the results be?  The treatment may:  Lower pain  Reduce swelling in the nerves  Improve movement  What happens before the procedure?  Your doctor will take your history. Talk to the doctor about:  All the drugs you are taking. Be sure to include all prescription and over-the-counter (OTC) drugs, and herbal supplements. Tell the doctor about any drug allergy. Bring a list of drugs you take with you.  If you have high blood sugar or diabetes. Your drugs may need to be changed.  Any bleeding problems. Be sure to tell your doctor if you are taking any drugs that may cause bleeding. Some of these are warfarin, rivaroxaban, apixaban, ticagrelor, clopidogrel, ketorolac, ibuprofen, naproxen, or aspirin. Certain vitamins and herbs, such as garlic and fish oil, may also add to the risk for bleeding. You may need to stop these drugs as well. Talk to your doctor about them.  Tell the doctor if you are pregnant.  You will not be allowed to drive right away after the procedure. Ask a family member or a friend to drive you home.  What happens during the procedure?  To help the doctor make sure the drugs are being injected in the right place, your doctor may do an x-ray of your spine. Other times your doctor may do a continuous x-ray during the procedure. This is a fluoroscopy. The doctor may also use a colored dye or a contrast dye to check where to inject the drug.  You may be given a drug to help you relax. You may be given a drug to make the area of the injection numb.  The doctor will clean the skin on your back or neck. This helps prevent infection.  The doctor will put a needle through the skin toward your spine. The drug will be injected into a space near the spine.  The needle will be taken out and a bandage will be placed over the injection site.  What happens after the procedure?  Staff will check on you to make sure you are doing well. They will tell you when you can go  home.  What care is needed at home?  Relax on the day of the injection.  Do not drive or run machines for at least 12 hours afterwards.  Apply ice to the injection site. Place an ice pack or a bag of frozen peas wrapped in a towel over the painful part. Never put ice right on the skin. Do not leave the ice on more than 10 to 15 minutes at a time.  It may take a few days before you will feel the effects of the injection.  What follow-up care is needed?  Your doctor may ask you to make visits to the office to check on your progress. Be sure to keep these visits. You may also need to see a physical therapist (PT). The PT will teach you exercises to help you get back your strength and motion. Ask your doctor when you can exercise.  What problems could happen?  Bleeding  Infection (rare)  Headache  Nerve injury  If you have diabetes, your blood sugar can go up after the injection. Check with your doctor if you need more treatment for this.  Last Reviewed Date

## 2024-05-09 NOTE — OP NOTE
Operation  Midline lumbar epidural steroid injection. Level performed L5-S1      Surgical indications  The patient is here today to receive an epidural steroid injection to assist with pain.    Operative report  The patient was taken to the procedure room, was placed into a prone positioning. The lumbar area was prepped and draped sterilely in the normal fashion. Under fluoroscopic guidance the epidural space was identified. The skin and subcutaneous tissue was anesthetized with 1% lidocaine. An 18-gauge Tuohy needle was introduced using the normal saline loss-of-resistance technique. Once the loss of resistance had been achieved, final positioning of the needle was confirmed with negative aspiration of heme and CSF, with the injection of contrast material showing spread in the epidural space, confirmed in AP and lateral view. Then the patient received 80 mg of Depo-Medrol diluted into normal saline preservative-free and 2 mL of 0.25% bupivacaine making total volume of 8 mL. The patient tolerated the procedure well, was taken to the recovery room, allowed to recover sufficient amount of time and then was discharged home in stable condition. The patient was advised to followup with the [facility] to reassess improvement on as-needed basis.    Thank you for allowing me to participate in the care of this patient.    Katherine Rodriguez MD  Medical director of [facility]   9:56 AM  05/09/24

## 2024-05-17 ENCOUNTER — TELEPHONE (OUTPATIENT)
Dept: PAIN MEDICINE | Facility: CLINIC | Age: 89
End: 2024-05-17

## 2024-08-26 ENCOUNTER — OFFICE VISIT (OUTPATIENT)
Dept: PAIN MEDICINE | Facility: CLINIC | Age: 89
End: 2024-08-26
Payer: MEDICARE

## 2024-08-26 VITALS
OXYGEN SATURATION: 97 % | SYSTOLIC BLOOD PRESSURE: 200 MMHG | DIASTOLIC BLOOD PRESSURE: 98 MMHG | RESPIRATION RATE: 18 BRPM | HEART RATE: 65 BPM

## 2024-08-26 DIAGNOSIS — M54.16 LUMBAR RADICULOPATHY, CHRONIC: Primary | ICD-10-CM

## 2024-08-26 PROCEDURE — 99214 OFFICE O/P EST MOD 30 MIN: CPT | Performed by: NURSE PRACTITIONER

## 2024-08-26 ASSESSMENT — PAIN - FUNCTIONAL ASSESSMENT: PAIN_FUNCTIONAL_ASSESSMENT: 0-10

## 2024-08-26 ASSESSMENT — PAIN SCALES - GENERAL
PAINLEVEL: 5
PAINLEVEL_OUTOF10: 5 - MODERATE PAIN

## 2024-08-26 ASSESSMENT — PAIN DESCRIPTION - DESCRIPTORS: DESCRIPTORS: ACHING

## 2024-08-26 NOTE — H&P
History Of Present Illness  Ant Seaman is a 89 y.o. male presenting for follow up regarding his low back pain. He is known in this clinic because of chronic low back pain that radiates to both hips to both lower extremities. His level of pain is about 5/10, describing it as constant pressure. He takes Tylenol alternate with Advil, states that his pain is mostly at night, it wakes him up with extreme pain in the left leg, also when he wakes up in the morning. He is good in the morning at times. He cares for his wife. OARRS obtained and reviewed, no abuse or misuse with prescribed medication noted.  He is still able to perform activities of daily living independently.  OARRS obtained and reviewed, no abuse or misuse with prescribed medication noted.  He had a midline epidural steroid injection L5/S1 done on 5/9/2024, he reports that he had a significant 75% relief from pain, that lasted for over 4 weeks. His pain before procedure was 8/10, after the procedure was less than 5/10.     Past Medical History  Past Medical History:   Diagnosis Date    Diverticulosis of intestine, part unspecified, without perforation or abscess without bleeding 11/30/2012    Diverticulosis    Encounter for screening for malignant neoplasm of colon 05/23/2016    Encounter for screening colonoscopy    Unspecified abdominal hernia without obstruction or gangrene 04/09/2014    Hernia       Surgical History  Past Surgical History:   Procedure Laterality Date    BLADDER SURGERY  11/30/2012    Bladder Surgery    COLONOSCOPY  11/30/2012    Complete Colonoscopy    CORONARY ANGIOPLASTY WITH STENT PLACEMENT  08/16/2013    Cath Stent Placement    HERNIA REPAIR  11/30/2012    Hernia Repair        Social History  He reports that he has never smoked. He has never used smokeless tobacco. He reports that he does not drink alcohol and does not use drugs.    Family History  Family History   Problem Relation Name Age of Onset    Heart failure Father       Lung cancer Brother  69        Allergies  Patient has no known allergies.    Review of Systems    Review of systems x 10 is negative.   No recent injury or falls reported.   No recent change in medical condition reported.   No recent weakness reported.   Still able to control bowel and bladder function.  Denies any problem with constipation.   Denies fever, cough, shortness of breath recently.   No interval change with medication/health issues reported.  Denies opioids diversion and abuse. Denies overuse of pain medications.  States his concern is weakness of lower extremities.  Physical Exam   Awake,alert, no acute distress, appropriate.  Spine is of normal curvature.  Full ROM on all 4 extremities, sensation and motor intact, no vascular compromise.  No pedal edema, normal gait, using walker today.  Skin warm, dry, intact, turgor is normal.  Intermittent numbness, tingling lower extremities.    Last Recorded Vitals  Blood pressure (!) 200/98, pulse 65, resp. rate 18, SpO2 97%.  He is not taking any medication for blood pressure, advised to monitor blood pressure   At home, if persistently high to let his PCP know.  Relevant Results  No recent imaging noted.     Assessment/Plan     Discussed about option of repeating the epidural steroid injection for his back.  He agrees to this, so he will be scheduled for midline epidural steroid injection  L5/S1, under fluoroscopic guidance to assist with the low back pain  Explained procedure to this patient, he had a good positive response to this .  His oswestry score is 9, mild disability level.  Follow up in 3 months time or as needed basis  Explained plan to this patient, and patient verbalized understanding and agreement with the plan. If there is questions or concerns, please feel free to contact me to clarify at [phone], M-F 8-4 PM.    Chronic back pain associated with neurogenic claudication and spinal stenosis with positive response to lumbar epidural steroid  injection managing currently with over the counter medications. He does not want new medications for his back pain       I spent 45 minutes in the professional and overall care of this patient.      Trina Quiñonez, APRN-CNP

## 2024-08-26 NOTE — PROGRESS NOTES
Subjective   Ant Seaman is a 89 y.o. male who presents for evaluation of his leg pain.  The pain is located in the lumbar area and goes down into his legs mostly the leg leg.   Here to discuss injection options.   Pain is a 5/10 at this time.   Past Medical History:   Diagnosis Date    Diverticulosis of intestine, part unspecified, without perforation or abscess without bleeding 11/30/2012    Diverticulosis    Encounter for screening for malignant neoplasm of colon 05/23/2016    Encounter for screening colonoscopy    Unspecified abdominal hernia without obstruction or gangrene 04/09/2014    Hernia     Past Surgical History:   Procedure Laterality Date    BLADDER SURGERY  11/30/2012    Bladder Surgery    COLONOSCOPY  11/30/2012    Complete Colonoscopy    CORONARY ANGIOPLASTY WITH STENT PLACEMENT  08/16/2013    Cath Stent Placement    HERNIA REPAIR  11/30/2012    Hernia Repair       I have reviewed the nurses notes and am aware of family/social history.     Review of Systems    Objective   Physical Exam    ASSESSMENT:     PLAN:   Discussed treatment plan with patient.   Call or return to clinic prn if these symptoms worsen or fail to improve as anticipated.     Gloria Krueger RN

## 2024-08-28 ENCOUNTER — OFFICE VISIT (OUTPATIENT)
Dept: PRIMARY CARE | Facility: CLINIC | Age: 89
End: 2024-08-28
Payer: MEDICARE

## 2024-08-28 VITALS
RESPIRATION RATE: 16 BRPM | OXYGEN SATURATION: 98 % | DIASTOLIC BLOOD PRESSURE: 80 MMHG | HEART RATE: 60 BPM | TEMPERATURE: 97.7 F | WEIGHT: 159 LBS | BODY MASS INDEX: 25.66 KG/M2 | SYSTOLIC BLOOD PRESSURE: 132 MMHG

## 2024-08-28 DIAGNOSIS — R03.0 ELEVATED BP WITHOUT DIAGNOSIS OF HYPERTENSION: Primary | ICD-10-CM

## 2024-08-28 PROCEDURE — 1159F MED LIST DOCD IN RCRD: CPT | Performed by: INTERNAL MEDICINE

## 2024-08-28 PROCEDURE — 3079F DIAST BP 80-89 MM HG: CPT | Performed by: INTERNAL MEDICINE

## 2024-08-28 PROCEDURE — 1123F ACP DISCUSS/DSCN MKR DOCD: CPT | Performed by: INTERNAL MEDICINE

## 2024-08-28 PROCEDURE — 1158F ADVNC CARE PLAN TLK DOCD: CPT | Performed by: INTERNAL MEDICINE

## 2024-08-28 PROCEDURE — 3075F SYST BP GE 130 - 139MM HG: CPT | Performed by: INTERNAL MEDICINE

## 2024-08-28 PROCEDURE — 1036F TOBACCO NON-USER: CPT | Performed by: INTERNAL MEDICINE

## 2024-08-28 PROCEDURE — 99214 OFFICE O/P EST MOD 30 MIN: CPT | Performed by: INTERNAL MEDICINE

## 2024-08-28 ASSESSMENT — ENCOUNTER SYMPTOMS
NUMBNESS: 1
HEADACHES: 0
NERVOUS/ANXIOUS: 0
SHORTNESS OF BREATH: 0

## 2024-08-28 ASSESSMENT — PATIENT HEALTH QUESTIONNAIRE - PHQ9
2. FEELING DOWN, DEPRESSED OR HOPELESS: NOT AT ALL
SUM OF ALL RESPONSES TO PHQ9 QUESTIONS 1 AND 2: 0
1. LITTLE INTEREST OR PLEASURE IN DOING THINGS: NOT AT ALL

## 2024-08-28 NOTE — PROGRESS NOTES
Patient here for a follow up on blood pressure     Subjective   Patient ID: Ant Seaman is a 89 y.o. male who presents for Follow-up.    The patient reports two recent elevated readings of blood pressure in the clinic since 5/2024 with a peak of 200/98 mmHg on 8/26/2024.  He has been measuring his blood pressure at home on a new [employer] sphygmomanometer and notes that average systolic readings are between 160-180 mmHg.  The patient takes the measurements at varying times throughout the day following meals.  He does not drink any caffeine.  He denies any headache, vision changes, dyspnea, chest pressure, chest pain, or anxiety prior to clinic visits.     The patient mentions intermittent paresthesia in the face which he attributes to cervical stenosis.        Review of Systems   Eyes:  Negative for visual disturbance.   Respiratory:  Negative for shortness of breath.    Cardiovascular:  Negative for chest pain.   Neurological:  Positive for numbness. Negative for headaches.   Psychiatric/Behavioral:  The patient is not nervous/anxious.        Objective   Physical Exam  Constitutional:       Appearance: Normal appearance.   Neck:      Vascular: No carotid bruit.   Cardiovascular:      Rate and Rhythm: Normal rate and regular rhythm.      Heart sounds: Normal heart sounds.   Pulmonary:      Effort: Pulmonary effort is normal.      Breath sounds: Normal breath sounds.   Abdominal:      General: Bowel sounds are normal.      Palpations: Abdomen is soft.      Tenderness: There is no abdominal tenderness.   Skin:     General: Skin is warm and dry.   Neurological:      General: No focal deficit present.      Mental Status: He is alert and oriented to person, place, and time. Mental status is at baseline.   Psychiatric:         Mood and Affect: Mood normal.         Behavior: Behavior normal.         Assessment/Plan   Problem List Items Addressed This Visit    None  Visit Diagnoses         Codes    Elevated BP without  diagnosis of hypertension    -  Primary R03.0    Relevant Orders    Follow Up In [facility] - Pharmacy            IMPRESSIONS/PLAN:      /80 in office today, 135/87 on repeat.  Instructed patient to bring in home cuff to Pharmacy for calibration and follow-up for repeated measurements.  Will consider additional management pending results.  Follow-up in 1 month for close monitoring.     HLD   - Stable, continue on atorvastatin 40mg every day, and ASA 81mg once daily.     CAD   - s/p stent placement, following with Dr. Waddell in Cardiology, takes ASA 81mg QD.     Lung Nodule   - Chest CT stable 9/22 - 6mm subpleural right lower lobe nodule, calcified walls of gallbladder, and hiatal hernia. 10/2023 repeat showed Interval development of mild tree-in-bud reticulonodular opacities in  the left upper lobe, nonspecific, but most likely related to infectious or inflammatory bronchiolitis. Stable 6 mm subpleural nodule in the right lower lobe.    Coarsened echotecture of liver   - US of gallbladder 4/2021 showed coarsened nodule contour of liver. He does not wish to work this up further.     Lumbar Spondylolisthesis   - Following with Dr. Rodriguez in Pain Medicine, s/p lumbar decompression under fluoroscopic guidance 8/18/2021.  Continue with Tylenol and Advil as directed, but drink plenty of water to mitigate adverse effects.       Painless Nodule of RLE, lateral  - No signs of inflammation.  Decreasing in size per patient.  Monitor for now.  Call the clinic if symptoms persist or worsen, and will order U/S.     Health Maintenance   - Routine labs 4/2024.      Follow up for regular wellness examinations, call sooner if needed.       Scribe Attestation  By signing my name below, I, Viridiana Pratt , [facility]   attest that this documentation has been prepared under the direction and in the presence of Edis Figueroa DO.   Viridiana Pratt 08/28/24 10:35 AM

## 2024-09-18 ENCOUNTER — CLINICAL SUPPORT (OUTPATIENT)
Dept: PRIMARY CARE | Facility: CLINIC | Age: 89
End: 2024-09-18
Payer: MEDICARE

## 2024-09-18 VITALS — HEART RATE: 58 BPM | DIASTOLIC BLOOD PRESSURE: 89 MMHG | SYSTOLIC BLOOD PRESSURE: 163 MMHG

## 2024-09-18 DIAGNOSIS — R03.0 ELEVATED BP WITHOUT DIAGNOSIS OF HYPERTENSION: Primary | ICD-10-CM

## 2024-09-18 DIAGNOSIS — I10 BENIGN ESSENTIAL HYPERTENSION: ICD-10-CM

## 2024-09-18 ASSESSMENT — ENCOUNTER SYMPTOMS
HEADACHES: 0
NECK PAIN: 0
HYPERTENSION: 1

## 2024-09-18 NOTE — PATIENT INSTRUCTIONS
It was a pleasure meeting you today! Here is a summary of what we discussed:     Aim to check blood pressure once daily. For each check you should sit and relax 5-10 minutes prior to obtaining the first reading, let the cuff inflate for the first reading, wait another 5-10 minutes, and then obtain a second reading  Record blood pressure readings on provided log     If you have any questions don't hesitate to contact myself at [phone].     Thank you!

## 2024-09-18 NOTE — PROGRESS NOTES
Hypertension/Blood Pressure Monitor Validation Initial Visit  Pharmacist Clinic: Hypertension Management    Ant Seaman was referred to the Clinical Pharmacy Team for his blood pressure management.    Referring Provider: Edis Figueroa, DO     Subjective     Hypertension  This is a chronic problem. The current episode started more than 1 year ago. The problem has been waxing and waning since onset. Pertinent negatives include no chest pain, headaches or neck pain. There are no associated agents to hypertension. Risk factors for coronary artery disease include dyslipidemia and male gender. Past treatments include nothing. There are no compliance problems.      Patient saw Dr. Figueroa on 08/28/24 for a follow-up on his blood pressure. Patient reported systolic readings at home in the 160s-180s at that encounter. His in-office BP was 132/80 mmHg and 135/87 mmHg on repeat. He was then referred to the pharmacy team for validation of his home blood pressure monitor and medication management of hypertension if needed.     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     Past Medical History:  He has a past medical history of Diverticulosis of intestine, part unspecified, without perforation or abscess without bleeding (11/30/2012), Encounter for screening for malignant neoplasm of colon (05/23/2016), and Unspecified abdominal hernia without obstruction or gangrene (04/09/2014).    Past Surgical History:  He has a past surgical history that includes Hernia repair (11/30/2012); Bladder surgery (11/30/2012); Colonoscopy (11/30/2012); and Coronary angioplasty with stent (08/16/2013).    Social History:  He reports that he has never smoked. He has never used smokeless tobacco. He reports that he does not drink alcohol and does not use drugs.    Family History:  Family History   Problem Relation Name Age of Onset    Heart failure Father      Lung cancer Brother  69       Allergies:  Patient has no known allergies.    Diet:    Typically has light meals  Breakfast: Toast or frozen waffle for breakfast  Lunch: [place]   Dinner: Healthy Choice or Lean Cuisine frozen meals   Snacks: may have some crackers   Fluids: diet Coke and water, decaffeinated coffee in the morning     Sodium Intake:  Does watch sodium, does not keep salt in the house to add to meals    Rarely will have foods in cans, if they do have beans will rinse them well     Physical Activity:   10 minutes or 2 miles on an exercise bike 5 days per week      Blood Pressure Monitor Device: Omron Series 3 purchased awhile ago purchased within the past 5 years   Does state that he recently replaced the cuff but did purchase the Omron brand cuff     Affordability/Accessibility: no issues reported   Adherence/Organization: does not forget to take his rosuvastatin   Adverse Effects: none reported     Hypertension Pharmacotherapy:  None    Historical Hypertension Pharmacotherapy:   None     SMBP Measurements:   Date Systolic Diastolic   24 211 98   24 160 97   24 182 107    172 100   24 160 99   24 172 99   24 172 96   24 163 81   24 174 95   Average:  174 97     ASSESSMENT OF SMBP MEASUREMENTS  Highest readin/98 mmHg  Lowest reading: 160/97 mmHg  Average: 174/97 mmHg    Medication Reconciliation  No changes were made to patient's medication record during encounter today     Current Outpatient Medications on File Prior to Visit   Medication Sig Dispense Refill    aspirin 81 mg EC tablet Take 1 tablet (81 mg) by mouth once daily.      atorvastatin (Lipitor) 40 mg tablet Take 1 tablet (40 mg) by mouth once daily at bedtime. 90 tablet 2    multivit-iron-minerals-folic acid (Centrum Silver) 0.4 mg-300 mcg- 250 mcg tab Take by mouth.       No current facility-administered medications on file prior to visit.      Objective     Vitals:    24 1220   BP: 163/89   BP Location: Left arm   Patient Position: Sitting   BP Cuff Size: Adult  "  Pulse: 58       BP Readings from Last 6 Encounters:   09/18/24 163/89   08/28/24 132/80   08/26/24 (!) 200/98   05/09/24 (!) 188/87   03/28/24 148/80   01/04/24 (!) 196/89     Wt Readings from Last 6 Encounters:   08/28/24 72.1 kg (159 lb)   09/28/23 74.4 kg (164 lb)   01/13/23 70.3 kg (155 lb)   12/29/22 70.3 kg (155 lb)   08/22/22 73.5 kg (162 lb)   09/23/21 75.3 kg (166 lb)     Estimated body mass index is 25.66 kg/m² as calculated from the following:    Height as of 1/13/23: 1.676 m (5' 6\").    Weight as of 8/28/24: 72.1 kg (159 lb).     RELEVANT LAB RESULTS  Lab Results   Component Value Date    BILITOT 0.7 04/01/2024    CALCIUM 9.0 04/01/2024    CO2 28 04/01/2024     04/01/2024    CREATININE 0.79 04/01/2024    GLUCOSE 94 04/01/2024    ALKPHOS 79 04/01/2024    K 4.2 04/01/2024    PROT 6.2 (L) 04/01/2024     04/01/2024    AST 28 04/01/2024    ALT 24 04/01/2024    BUN 16 04/01/2024    ANIONGAP 13 04/01/2024    ALBUMIN 4.1 04/01/2024    GFRMALE 74 09/28/2023     Lab Results   Component Value Date    TRIG 80 04/01/2024    CHOL 108 04/01/2024    LDLCALC 24 04/01/2024    HDL 67.8 04/01/2024     No results found for: \"HGBA1C\"  The ASCVD Risk score (Felix FLORES, et al., 2019) failed to calculate for the following reasons:    The 2019 ASCVD risk score is only valid for ages 40 to 79    DRUG INTERACTIONS  No significant drug-drug interactions exist that require change in therapy    DEVICE ACCURACY TEST   Care team should take five blood pressure readings using a combination of the patient's SMBP device and the office's method of blood pressure measurement.  STEP 1:              A Patient's Monitor left arm 181/95 mmHg HR 61   B Patient's Monitor left arm 167/99 mmHg HR 59   C Office's Monitor left arm 163/89 mmHg HR 58   D Patient's Monitor left arm 165/101 mmHg HR 59   E Office's Monitor left arm Not needed      STEP 2:  Part 1: Average measurements B and D   Part 2: Compare average of B and D to measurement " C   Part 3: If the difference is …  --> Less than 5 mm Hg, this device can be used for SMBP  --> Between 6 and 10 mm Hg, proceed to Step 3  --> Greater than 10 mm Hg, replace the device before proceeding with your SMBP program  STEP 3:  Part 1: Average measurements C and E   Part 2: Compare average of C and E to measurement D   Part 3: If the difference is …  --> Less than or equal to 10 mm Hg, this device can be used for SMBP  --> Greater than 10 mm Hg, replace the device before proceeding with your SMBP program    CONCLUSION: This BP monitor is not acceptable for home BP monitoring.    Assessment/Plan   Problem List Items Addressed This Visit       Benign essential hypertension     Blood Pressure monitor is not validated for at home use  Blood pressure log/diary was present during today's visit.   Smoker status: non-smoker  Blood Pressure: poorly controlled based on reading with in-office monitor today.   Patient's systolic blood pressure was close to our in-office monitor, however, the diastolic was running ~10 mmHg higher on his home monitor vs the monitor in-office. BP monitor was likely 3-4 years old. Patient was encouraged to purchase new Omron BP monitor and was taken downstairs to purchase from our  Pharmacy today after encounter. New Omron monitor comes pre-validated and does not require validation.   Patient is not currently on antihypertensive medications. He will monitor with his new monitor at home and bring log to follow-up with Dr. Figueroa in 2 weeks.   Counseling Points:  I have counseled this patient on appropriate blood pressure monitor techniques, provided a blood pressure monitor log, and have advised the patient to contact me with questions regarding their blood pressure.  Patient was instructed to aim to test blood pressure once daily. For each check he should sit and wait 5-10 minutes prior to obtaining first reading, then wait another 5-10 minutes and obtain a second reading.   Medications  are properly dosed for renal and hepatic function.  We will plan to follow-up in 4 weeks and patient will see Dr. Figueroa in 2 weeks. He was instructed to record his readings on the log provided and bring with him when he sees Dr. Figueroa. He was given my contact information and encouraged to reach out with any questions and/or concerns.           Other Visit Diagnoses       Elevated BP without diagnosis of hypertension    -  Primary    Relevant Orders    Follow Up In [facility] - Pharmacy          Pharmacy Follow Up: October 17, 2024    PCP Follow Up: October 2, 2024    Chema ThayerD    Continue all meds under the continuation of care with the referring provider and clinical pharmacy team.

## 2024-09-18 NOTE — Clinical Note
Monitor failed validation due to diastolic reading (systolic was actually ok). He's going to monitor with his new monitor purchased down stairs today and will bring log to visit with you in 2 weeks.   If you have any questions let me know!   Thank you!

## 2024-09-18 NOTE — ASSESSMENT & PLAN NOTE
Blood Pressure monitor is not validated for at home use  Blood pressure log/diary was present during today's visit.   Smoker status: non-smoker  Blood Pressure: poorly controlled based on reading with in-office monitor today.   Patient's systolic blood pressure was close to our in-office monitor, however, the diastolic was running ~10 mmHg higher on his home monitor vs the monitor in-office. BP monitor was likely 3-4 years old. Patient was encouraged to purchase new Omron BP monitor and was taken downstairs to purchase from our  Pharmacy today after encounter. New Omron monitor comes pre-validated and does not require validation.   Patient is not currently on antihypertensive medications. He will monitor with his new monitor at home and bring log to follow-up with Dr. Figueroa in 2 weeks.   Counseling Points:  I have counseled this patient on appropriate blood pressure monitor techniques, provided a blood pressure monitor log, and have advised the patient to contact me with questions regarding their blood pressure.  Patient was instructed to aim to test blood pressure once daily. For each check he should sit and wait 5-10 minutes prior to obtaining first reading, then wait another 5-10 minutes and obtain a second reading.   Medications are properly dosed for renal and hepatic function.  We will plan to follow-up in 4 weeks and patient will see Dr. Figueroa in 2 weeks. He was instructed to record his readings on the log provided and bring with him when he sees Dr. Figueroa. He was given my contact information and encouraged to reach out with any questions and/or concerns.

## 2024-09-20 ENCOUNTER — APPOINTMENT (OUTPATIENT)
Dept: PHARMACY | Facility: HOSPITAL | Age: 89
End: 2024-09-20
Payer: MEDICARE

## 2024-09-26 ENCOUNTER — HOSPITAL ENCOUNTER (OUTPATIENT)
Dept: PAIN MEDICINE | Facility: CLINIC | Age: 89
Discharge: HOME | End: 2024-09-26
Payer: MEDICARE

## 2024-09-26 VITALS
HEART RATE: 60 BPM | TEMPERATURE: 96.8 F | DIASTOLIC BLOOD PRESSURE: 90 MMHG | SYSTOLIC BLOOD PRESSURE: 160 MMHG | OXYGEN SATURATION: 98 % | RESPIRATION RATE: 18 BRPM

## 2024-09-26 DIAGNOSIS — M54.16 LUMBAR RADICULOPATHY, CHRONIC: ICD-10-CM

## 2024-09-26 PROCEDURE — 2500000004 HC RX 250 GENERAL PHARMACY W/ HCPCS (ALT 636 FOR OP/ED): Performed by: PAIN MEDICINE

## 2024-09-26 PROCEDURE — 2550000001 HC RX 255 CONTRASTS: Performed by: PAIN MEDICINE

## 2024-09-26 PROCEDURE — 2500000005 HC RX 250 GENERAL PHARMACY W/O HCPCS: Performed by: PAIN MEDICINE

## 2024-09-26 RX ORDER — BUPIVACAINE HYDROCHLORIDE 2.5 MG/ML
INJECTION, SOLUTION EPIDURAL; INFILTRATION; INTRACAUDAL AS NEEDED
Status: COMPLETED | OUTPATIENT
Start: 2024-09-26 | End: 2024-09-26

## 2024-09-26 RX ORDER — LIDOCAINE HYDROCHLORIDE 10 MG/ML
INJECTION, SOLUTION EPIDURAL; INFILTRATION; INTRACAUDAL; PERINEURAL AS NEEDED
Status: COMPLETED | OUTPATIENT
Start: 2024-09-26 | End: 2024-09-26

## 2024-09-26 RX ORDER — METHYLPREDNISOLONE ACETATE 80 MG/ML
INJECTION, SUSPENSION INTRA-ARTICULAR; INTRALESIONAL; INTRAMUSCULAR; SOFT TISSUE AS NEEDED
Status: COMPLETED | OUTPATIENT
Start: 2024-09-26 | End: 2024-09-26

## 2024-09-26 ASSESSMENT — COLUMBIA-SUICIDE SEVERITY RATING SCALE - C-SSRS
2. HAVE YOU ACTUALLY HAD ANY THOUGHTS OF KILLING YOURSELF?: NO
6. HAVE YOU EVER DONE ANYTHING, STARTED TO DO ANYTHING, OR PREPARED TO DO ANYTHING TO END YOUR LIFE?: NO
1. IN THE PAST MONTH, HAVE YOU WISHED YOU WERE DEAD OR WISHED YOU COULD GO TO SLEEP AND NOT WAKE UP?: NO

## 2024-09-26 ASSESSMENT — ENCOUNTER SYMPTOMS
OCCASIONAL FEELINGS OF UNSTEADINESS: 1
LOSS OF SENSATION IN FEET: 0

## 2024-09-26 ASSESSMENT — PAIN DESCRIPTION - DESCRIPTORS: DESCRIPTORS: SHARP

## 2024-09-26 ASSESSMENT — PAIN - FUNCTIONAL ASSESSMENT: PAIN_FUNCTIONAL_ASSESSMENT: 0-10

## 2024-09-26 ASSESSMENT — PAIN SCALES - GENERAL: PAINLEVEL_OUTOF10: 5 - MODERATE PAIN

## 2024-09-26 NOTE — DISCHARGE INSTRUCTIONS
Post-injection instructions:    Your pain may not be gone immediately after the procedure--it usually takes the steroid 3-5 days to start working.   It may take several weeks for the medicine to reach its' full effect.   Pay attention to how much pain relief (what percentage compared to before the procedure) you get and for how long it lasts.     Activity: Avoid strenuous activity for 24 hours. After that return to your normal activity level.     Bandages: Remove after 24 hours     Showering/Bathing: You may shower after bandage is removed     Follow up: CALL OFFICE IN 7 DAYS [phone] LEAVE MESSAGE ABOUT THE RELIEF THAT WAS OBTAINED      Call the doctor immediately: if you notice:     Excessive bleeding from procedure site (brisk bright red bleeding from the site or bleeding that soaks the bandages or does not stop)   Severe headache  Inability to walk, leg or arm weakness or numbness that is worse after the procedure   Uncontrolled pain   New urinary or fecal incontinence   Signs of infection: Fever above 101.5F, redness, swelling, pus or drainage from the site    Epidural Injection    Why is this procedure done?  With an epidural injection, the doctor injects drugs deep into the area around your spinal cord. This is different than epidural anesthesia that is used for surgery or when a woman has a baby. Your spine is a group of bones in your back that protect the nerves in your spinal cord. Problems with your spine can cause swollen nerves in the spinal cord. This swelling leads to pain and can limit movement. In an epidural injection, the doctor may give you a drug to help with swelling and pain.  You may have an epidural injection in different parts of your back, based on where your pain is. For pain in your head or arms, you may have a cervical epidural injection. If your pain is in your upper or middle back, you may get a thoracic epidural injection. For pain in your lower back or legs, you may get a  lumbar epidural injection.    What will the results be?  The treatment may:  Lower pain  Reduce swelling in the nerves  Improve movement  What happens before the procedure?  Your doctor will take your history. Talk to the doctor about:  All the drugs you are taking. Be sure to include all prescription and over-the-counter (OTC) drugs, and herbal supplements. Tell the doctor about any drug allergy. Bring a list of drugs you take with you.  If you have high blood sugar or diabetes. Your drugs may need to be changed.  Any bleeding problems. Be sure to tell your doctor if you are taking any drugs that may cause bleeding. Some of these are warfarin, rivaroxaban, apixaban, ticagrelor, clopidogrel, ketorolac, ibuprofen, naproxen, or aspirin. Certain vitamins and herbs, such as garlic and fish oil, may also add to the risk for bleeding. You may need to stop these drugs as well. Talk to your doctor about them.  Tell the doctor if you are pregnant.  You will not be allowed to drive right away after the procedure. Ask a family member or a friend to drive you home.  What happens during the procedure?  To help the doctor make sure the drugs are being injected in the right place, your doctor may do an x-ray of your spine. Other times your doctor may do a continuous x-ray during the procedure. This is a fluoroscopy. The doctor may also use a colored dye or a contrast dye to check where to inject the drug.  You may be given a drug to help you relax. You may be given a drug to make the area of the injection numb.  The doctor will clean the skin on your back or neck. This helps prevent infection.  The doctor will put a needle through the skin toward your spine. The drug will be injected into a space near the spine.  The needle will be taken out and a bandage will be placed over the injection site.  What happens after the procedure?  Staff will check on you to make sure you are doing well. They will tell you when you can go  home.  What care is needed at home?  Relax on the day of the injection.  Do not drive or run machines for at least 12 hours afterwards.  Apply ice to the injection site. Place an ice pack or a bag of frozen peas wrapped in a towel over the painful part. Never put ice right on the skin. Do not leave the ice on more than 10 to 15 minutes at a time.  It may take a few days before you will feel the effects of the injection.  What follow-up care is needed?  Your doctor may ask you to make visits to the office to check on your progress. Be sure to keep these visits. You may also need to see a physical therapist (PT). The PT will teach you exercises to help you get back your strength and motion. Ask your doctor when you can exercise.  What problems could happen?  Bleeding  Infection (rare)  Headache  Nerve injury  If you have diabetes, your blood sugar can go up after the injection. Check with your doctor if you need more treatment for this.  Last Reviewed Date

## 2024-10-02 ENCOUNTER — APPOINTMENT (OUTPATIENT)
Dept: PRIMARY CARE | Facility: CLINIC | Age: 89
End: 2024-10-02
Payer: MEDICARE

## 2024-10-02 VITALS
BODY MASS INDEX: 25.02 KG/M2 | SYSTOLIC BLOOD PRESSURE: 128 MMHG | HEART RATE: 62 BPM | RESPIRATION RATE: 16 BRPM | TEMPERATURE: 96.9 F | DIASTOLIC BLOOD PRESSURE: 70 MMHG | WEIGHT: 155 LBS | OXYGEN SATURATION: 94 %

## 2024-10-02 DIAGNOSIS — R91.1 LUNG NODULE: ICD-10-CM

## 2024-10-02 DIAGNOSIS — M48.061 SPINAL STENOSIS OF LUMBAR REGION, UNSPECIFIED WHETHER NEUROGENIC CLAUDICATION PRESENT: ICD-10-CM

## 2024-10-02 DIAGNOSIS — E78.2 MIXED HYPERLIPIDEMIA: Primary | ICD-10-CM

## 2024-10-02 PROCEDURE — 1036F TOBACCO NON-USER: CPT | Performed by: INTERNAL MEDICINE

## 2024-10-02 PROCEDURE — 1160F RVW MEDS BY RX/DR IN RCRD: CPT | Performed by: INTERNAL MEDICINE

## 2024-10-02 PROCEDURE — 1159F MED LIST DOCD IN RCRD: CPT | Performed by: INTERNAL MEDICINE

## 2024-10-02 PROCEDURE — 3074F SYST BP LT 130 MM HG: CPT | Performed by: INTERNAL MEDICINE

## 2024-10-02 PROCEDURE — 99214 OFFICE O/P EST MOD 30 MIN: CPT | Performed by: INTERNAL MEDICINE

## 2024-10-02 PROCEDURE — G2211 COMPLEX E/M VISIT ADD ON: HCPCS | Performed by: INTERNAL MEDICINE

## 2024-10-02 PROCEDURE — 3078F DIAST BP <80 MM HG: CPT | Performed by: INTERNAL MEDICINE

## 2024-10-02 ASSESSMENT — ENCOUNTER SYMPTOMS
COUGH: 0
BACK PAIN: 1
DIARRHEA: 0
SHORTNESS OF BREATH: 0
ABDOMINAL PAIN: 0
CONSTIPATION: 0

## 2024-10-11 ENCOUNTER — APPOINTMENT (OUTPATIENT)
Dept: PAIN MEDICINE | Facility: CLINIC | Age: 89
End: 2024-10-11
Payer: MEDICARE

## 2024-10-11 ENCOUNTER — TELEPHONE (OUTPATIENT)
Dept: PRIMARY CARE | Facility: CLINIC | Age: 89
End: 2024-10-11
Payer: MEDICARE

## 2024-10-11 ENCOUNTER — TELEPHONE (OUTPATIENT)
Dept: PAIN MEDICINE | Facility: CLINIC | Age: 89
End: 2024-10-11
Payer: MEDICARE

## 2024-10-11 DIAGNOSIS — M48.061 SPINAL STENOSIS OF LUMBAR REGION, UNSPECIFIED WHETHER NEUROGENIC CLAUDICATION PRESENT: Primary | ICD-10-CM

## 2024-10-11 RX ORDER — TRAMADOL HYDROCHLORIDE 50 MG/1
50 TABLET ORAL 2 TIMES DAILY PRN
Qty: 14 TABLET | Refills: 0 | Status: SHIPPED | OUTPATIENT
Start: 2024-10-11 | End: 2024-10-18

## 2024-10-11 NOTE — TELEPHONE ENCOUNTER
Pt called in requesting for Dr Figueroa to call him to discuss his R hip pain he is experiencing. Please advise. Thank you!

## 2024-10-11 NOTE — TELEPHONE ENCOUNTER
This patient has called in to say he is in severe pain and also clarified that he did not notice any relief after lesb 2 weeks ago.States it woke him up today from sleep.  He states he took one advil. Dr gallagher aware. I called patient and requested h e come in to be seen today. He made an apointment  for 9:15.  He then called at 9 am and spoke with another nurse and said he cannot make it in because he is in too much pain, so he cancelled his appointment.

## 2024-10-11 NOTE — TELEPHONE ENCOUNTER
Ant has stated he cannot leave the house today due to caregiver issues with his wife and unable to walk due to the pain.  Dr Rodriguez has sent over a prescription for tramadol , I called Ant back to let him know and he states he does not want to take a narcotic. Acknowledged and he states he will continue with over the counter advil and tylenol and heat and ice over the weekend and I have made him an office appointment for Monday as he hopes he will be able to attend a visit then.

## 2024-10-11 NOTE — TELEPHONE ENCOUNTER
Spoke to Ant.  Still having significant right sided hip pain.  We discussed the ER if things do not improve or get worse over the weekend to rule out an acute fracture- he was agreeable.  In the interim- he will increase his Advil to 2 tablets every 6 hours (he will be sure to take with food).  He will keep taking his Tylenol as well.  I assured him Tramadol sent in by pain management is a fairly safe option for him, especially at low doses.  He will have this filled and will consider it if needed.  He knows not to drive if he does take this.  He will let me know on Monday how he is doing but will proceed to [facility] as noted above if things worsen.   General

## 2024-10-14 ENCOUNTER — APPOINTMENT (OUTPATIENT)
Dept: PAIN MEDICINE | Facility: CLINIC | Age: 89
End: 2024-10-14
Payer: MEDICARE

## 2024-10-17 ENCOUNTER — APPOINTMENT (OUTPATIENT)
Dept: PHARMACY | Facility: HOSPITAL | Age: 89
End: 2024-10-17
Payer: MEDICARE

## 2024-10-17 DIAGNOSIS — R03.0 ELEVATED BP WITHOUT DIAGNOSIS OF HYPERTENSION: ICD-10-CM

## 2024-10-17 DIAGNOSIS — I10 BENIGN ESSENTIAL HYPERTENSION: Primary | ICD-10-CM

## 2024-10-17 ASSESSMENT — ENCOUNTER SYMPTOMS
NECK PAIN: 0
HEADACHES: 0
HYPERTENSION: 1

## 2024-10-17 NOTE — PROGRESS NOTES
Pharmacist Clinic: Hypertension Management    Patient is sent at the request of Edis Figueroa DO for my opinion regarding hypertension. My final recommendations will be communicated back to the requesting provider by way of shared medical record.  _______________________________________________________________________  Referring Provider: Edis iFgueroa DO     Subjective     Hypertension  This is a chronic problem. The current episode started more than 1 year ago. The problem has been waxing and waning since onset. Pertinent negatives include no chest pain, headaches or neck pain. There are no associated agents to hypertension. Risk factors for coronary artery disease include dyslipidemia and male gender. Past treatments include nothing. There are no compliance problems.      At last pharmacy encounter patient's blood pressure monitor was reading higher than our in-office monitor and he purchased a new Omron Series 3 from our [facility]. We are following-up today to see how his home blood pressures have been.     Since our last encounter he saw Dr. Figueroa on 10/02/24 and he brought in his blood pressure log. It was noted that he had some occasional high readings but they seemed to correlate with back pain and BP in office was well controlled at 128/70 mmHg.     Past Medical History:  He has a past medical history of Diverticulosis of intestine, part unspecified, without perforation or abscess without bleeding (11/30/2012), Encounter for screening for malignant neoplasm of colon (05/23/2016), and Unspecified abdominal hernia without obstruction or gangrene (04/09/2014).    Past Surgical History:  He has a past surgical history that includes Hernia repair (11/30/2012); Bladder surgery (11/30/2012); Colonoscopy (11/30/2012); and Coronary angioplasty with stent (08/16/2013).    Social History:  He reports that he has never smoked. He has never used smokeless tobacco. He reports that he does not drink  alcohol and does not use drugs.    Family History:  Family History   Problem Relation Name Age of Onset    Heart failure Father      Lung cancer Brother  69     Allergies:  Patient has no known allergies.    Diet:   Typically has light meals  Breakfast: Toast or frozen waffle for breakfast  Lunch: [place]   Dinner: Healthy Choice or Lean Cuisine frozen meals   Snacks: may have some crackers   Fluids: diet Coke and water, decaffeinated coffee in the morning      Sodium Intake:  Does watch sodium, does not keep salt in the house to add to meals    Rarely will have foods in cans, if they do have beans will rinse them well      Physical Activity:   10 minutes or 2 miles on an exercise bike 5 days per week      Blood Pressure Monitor Validated: No;  Purchased Omron Series 3 monitor after last pharmacy encounter which comes pre-validated    Adverse Effects: none reported today     SMBP MEASUREMENTS  Log attached to appointment today     ASSESSMENT OF SMBP MEASUREMENTS  10/03/24 - 10/17/24:  Highest readin/99 mmHg (patient noted back pain at this time)  Lowest readin/80  mmHg  Average: 141/87 mmHg    Has the patient experienced any low blood pressures since last contact? No  denies symptoms of low blood pressure: lightheadedness, dizziness, falls, blurry vision, clammy/pale skin     Has the patient experienced any high blood pressures since last contact? No  denies symptoms of high blood pressure: headache, chest pain, vision problems    Objective      Hypertension Pharmacotherapy:  None     Historical Hypertension Pharmacotherapy:   None      Medication Reconciliation:   No changes were made to patient's medication list during encounter today     Current Outpatient Medications on File Prior to Visit   Medication Sig Dispense Refill    aspirin 81 mg EC tablet Take 1 tablet (81 mg) by mouth once daily.      atorvastatin (Lipitor) 40 mg tablet Take 1 tablet (40 mg) by mouth once daily at bedtime. 90 tablet 2     "multivit-iron-minerals-folic acid (Centrum Silver) 0.4 mg-300 mcg- 250 mcg tab Take by mouth.      traMADol (Ultram) 50 mg tablet Take 1 tablet (50 mg) by mouth 2 times a day as needed for moderate pain (4 - 6) for up to 7 days. 14 tablet 0     No current facility-administered medications on file prior to visit.      RELEVANT LAB RESULTS  Lab Results   Component Value Date    BILITOT 0.7 04/01/2024    CALCIUM 9.0 04/01/2024    CO2 28 04/01/2024     04/01/2024    CREATININE 0.79 04/01/2024    GLUCOSE 94 04/01/2024    ALKPHOS 79 04/01/2024    K 4.2 04/01/2024    PROT 6.2 (L) 04/01/2024     04/01/2024    AST 28 04/01/2024    ALT 24 04/01/2024    BUN 16 04/01/2024    ANIONGAP 13 04/01/2024    ALBUMIN 4.1 04/01/2024    GFRMALE 74 09/28/2023     Lab Results   Component Value Date    TRIG 80 04/01/2024    CHOL 108 04/01/2024    LDLCALC 24 04/01/2024    HDL 67.8 04/01/2024     No results found for: \"HGBA1C\"  The ASCVD Risk score (Felix DK, et al., 2019) failed to calculate for the following reasons:    The 2019 ASCVD risk score is only valid for ages 40 to 79    DRUG INTERACTIONS  No significant drug-drug interactions exist that require change in therapy     Assessment/Plan   Problem List Items Addressed This Visit       Benign essential hypertension - Primary     Blood Pressure monitor was just purchased last month and comes pre-validated for home use.   Blood pressure log/diary was present during today's visit.   Smoker status: non-smoker  Blood Pressure: borderline uncontrolled  Patient's blood pressure tends to fluctuate, with his higher readings being elevated likely due to pain. No recommendations for therapy at this time, if blood pressure is continuing to consistently be >140/90 mmHg patient may require treatment in the future.   On no meds for BP and needs none at this time.  Counseling Points:  I have counseled this patient on appropriate blood pressure monitor techniques, provided a blood pressure " monitor log, and have advised the patient to contact me with questions regarding their blood pressure.  Discussed with patient continuing to check blood pressure at home. Patient does not want to continue keeping a log and checking blood pressure frequently. He was encouraged to still periodically check at home and to reach out to Dr. Figueroa's office if elevated.  Medications are properly dosed for renal and hepatic function.  I would like to follow-up one more time in 1.5-2 months to ensure blood pressure is staying in a better range since it seemed to increase slightly over the past week. Patient denied any pain during that time. At this time patient declined follow-up but was encouraged to reach out if he has any questions and/or concerns.           Other Visit Diagnoses       Elevated BP without diagnosis of hypertension              Pharmacy Follow Up: As needed per patient or provider request   PCP Follow Up: April 2, 2024    Thank you,  Faheem Gallego, PharmD    Continue all meds under the continuation of care with the referring provider and clinical pharmacy team.

## 2024-10-17 NOTE — ASSESSMENT & PLAN NOTE
Blood Pressure monitor was just purchased last month and comes pre-validated for home use.   Blood pressure log/diary was present during today's visit.   Smoker status: non-smoker  Blood Pressure: borderline uncontrolled  Patient's blood pressure tends to fluctuate, with his higher readings being elevated likely due to pain. No recommendations for therapy at this time, if blood pressure is continuing to consistently be >140/90 mmHg patient may require treatment in the future.   On no meds for BP and needs none at this time.  Counseling Points:  I have counseled this patient on appropriate blood pressure monitor techniques, provided a blood pressure monitor log, and have advised the patient to contact me with questions regarding their blood pressure.  Discussed with patient continuing to check blood pressure at home. Patient does not want to continue keeping a log and checking blood pressure frequently. He was encouraged to still periodically check at home and to reach out to Dr. Figueroa's office if elevated.  Medications are properly dosed for renal and hepatic function.  I would like to follow-up one more time in 1.5-2 months to ensure blood pressure is staying in a better range since it seemed to increase slightly over the past week. Patient denied any pain during that time. At this time patient declined follow-up but was encouraged to reach out if he has any questions and/or concerns.

## 2024-10-28 ENCOUNTER — APPOINTMENT (OUTPATIENT)
Dept: PAIN MEDICINE | Facility: CLINIC | Age: 89
End: 2024-10-28
Payer: MEDICARE

## 2024-11-20 DIAGNOSIS — E78.2 MIXED HYPERLIPIDEMIA: ICD-10-CM

## 2024-11-20 RX ORDER — ATORVASTATIN CALCIUM 40 MG/1
40 TABLET, FILM COATED ORAL NIGHTLY
Qty: 90 TABLET | Refills: 2 | Status: SHIPPED | OUTPATIENT
Start: 2024-11-20

## 2024-12-30 ENCOUNTER — TELEPHONE (OUTPATIENT)
Dept: PAIN MEDICINE | Facility: CLINIC | Age: 89
End: 2024-12-30
Payer: MEDICARE

## 2024-12-30 NOTE — TELEPHONE ENCOUNTER
Returning call; patient requested repeat lumbar epidural, ben stated patient needs fuv first, left voicemail

## 2025-01-07 ENCOUNTER — APPOINTMENT (OUTPATIENT)
Dept: PAIN MEDICINE | Facility: CLINIC | Age: OVER 89
End: 2025-01-07
Payer: MEDICARE

## 2025-04-02 ENCOUNTER — APPOINTMENT (OUTPATIENT)
Dept: PRIMARY CARE | Facility: CLINIC | Age: OVER 89
End: 2025-04-02
Payer: MEDICARE

## 2025-04-02 VITALS
DIASTOLIC BLOOD PRESSURE: 70 MMHG | HEART RATE: 66 BPM | RESPIRATION RATE: 16 BRPM | OXYGEN SATURATION: 98 % | WEIGHT: 155 LBS | BODY MASS INDEX: 25.02 KG/M2 | SYSTOLIC BLOOD PRESSURE: 128 MMHG | TEMPERATURE: 97.5 F

## 2025-04-02 DIAGNOSIS — E78.2 MIXED HYPERLIPIDEMIA: ICD-10-CM

## 2025-04-02 DIAGNOSIS — M48.061 SPINAL STENOSIS OF LUMBAR REGION, UNSPECIFIED WHETHER NEUROGENIC CLAUDICATION PRESENT: ICD-10-CM

## 2025-04-02 DIAGNOSIS — Z00.00 MEDICARE ANNUAL WELLNESS VISIT, SUBSEQUENT: Primary | ICD-10-CM

## 2025-04-02 PROCEDURE — 3078F DIAST BP <80 MM HG: CPT | Performed by: INTERNAL MEDICINE

## 2025-04-02 PROCEDURE — 1170F FXNL STATUS ASSESSED: CPT | Performed by: INTERNAL MEDICINE

## 2025-04-02 PROCEDURE — 1036F TOBACCO NON-USER: CPT | Performed by: INTERNAL MEDICINE

## 2025-04-02 PROCEDURE — 93000 ELECTROCARDIOGRAM COMPLETE: CPT | Performed by: INTERNAL MEDICINE

## 2025-04-02 PROCEDURE — 3074F SYST BP LT 130 MM HG: CPT | Performed by: INTERNAL MEDICINE

## 2025-04-02 PROCEDURE — G0439 PPPS, SUBSEQ VISIT: HCPCS | Performed by: INTERNAL MEDICINE

## 2025-04-02 PROCEDURE — 1159F MED LIST DOCD IN RCRD: CPT | Performed by: INTERNAL MEDICINE

## 2025-04-02 PROCEDURE — G2211 COMPLEX E/M VISIT ADD ON: HCPCS | Performed by: INTERNAL MEDICINE

## 2025-04-02 PROCEDURE — 99214 OFFICE O/P EST MOD 30 MIN: CPT | Performed by: INTERNAL MEDICINE

## 2025-04-02 ASSESSMENT — ENCOUNTER SYMPTOMS
CONSTIPATION: 1
DIARRHEA: 0
ABDOMINAL PAIN: 0
SHORTNESS OF BREATH: 0
COUGH: 0
BACK PAIN: 1

## 2025-04-02 ASSESSMENT — ACTIVITIES OF DAILY LIVING (ADL)
TAKING_MEDICATION: INDEPENDENT
GROCERY_SHOPPING: NEEDS ASSISTANCE
MANAGING_FINANCES: INDEPENDENT
DOING_HOUSEWORK: INDEPENDENT
DRESSING: INDEPENDENT
BATHING: INDEPENDENT

## 2025-04-02 NOTE — PROGRESS NOTES
Patient here for a medicare wellness visit and follow up    Subjective   Patient ID: Ant Seaman is a 89 y.o. male who presents for Follow-up and Medicare Annual Wellness Visit Subsequent.    The patient reports ongoing back pain due to lumbar spondylolisthesis, particularly at night time.  He has been taking Ibuprofen 800mg once daily, and Tylenol as well.  He recalls undergoing lumbar decompression with fluoroscopic guidance in 8/2021, but did not experience any relief of symptoms.  The patient has undergone six epidural steroid injections in the past, and states that four were beneficial.  He recently reached out to  from Pain Management who recommended medical management, though the patient would like to avoid medications at this time.  He inquires about further management options.    The patient exercises regularly at home on his stationary cycle, and with his tension bands.  He denies any cough, dyspnea, chest pressure, or chest pain.  An EKG in the office today was unremarkable as compared to previous.    The patient regularly takes an over the counter laxative, and notes a bowel movement every third day.  He denies any abdominal pain or other bowel problems.    The patient states that he occasionally is awoken at night time by a chill which subsides with movement.  Symptoms occur several months apart in frequency.        Review of Systems   Respiratory:  Negative for cough and shortness of breath.    Cardiovascular:  Negative for chest pain.   Gastrointestinal:  Positive for constipation. Negative for abdominal pain and diarrhea.   Musculoskeletal:  Positive for back pain.     Objective   Physical Exam  Constitutional:       Appearance: Normal appearance.   Neck:      Vascular: No carotid bruit.   Cardiovascular:      Rate and Rhythm: Normal rate and regular rhythm.      Heart sounds: Normal heart sounds.   Pulmonary:      Effort: Pulmonary effort is normal.      Breath sounds: Normal breath  sounds.   Abdominal:      General: Bowel sounds are normal.      Palpations: Abdomen is soft.      Tenderness: There is no abdominal tenderness.   Skin:     General: Skin is warm and dry.   Neurological:      General: No focal deficit present.      Mental Status: He is alert and oriented to person, place, and time. Mental status is at baseline.   Psychiatric:         Mood and Affect: Mood normal.         Behavior: Behavior normal.         Assessment/Plan   Problem List Items Addressed This Visit             ICD-10-CM    Hyperlipidemia - Primary E78.5    Relevant Orders    ECG 12 lead (Clinic Performed) (Completed)    CBC and Auto Differential    Comprehensive metabolic panel    Lipid panel    Lumbar stenosis M48.061    Relevant Orders    Referral to Neurosurgery       Medicare Wellness Examination Done  -  Discussed healthy diet and regular exercise.    -  Physical exam overall unremarkable. Immunizations reviewed and updated accordingly. Healthy lifestyle choices discussed (tobacco avoidance, appropriate alcohol use, avoidance of illicit substances).   -  Patient is wearing seatbelt.   -  Screening lab work ordered as indicated.    -  Age appropriate screening tests reviewed with patient.       EKG unremarkable as compared to previous.    IMPRESSIONS/PLAN:       Lumbar Spondylolisthesis   - Following with Dr. Rodriguez in Pain Medicine, s/p lumbar decompression under fluoroscopic guidance 8/18/2021.  Continue with Tylenol and Advil as directed, but drink plenty of water to mitigate adverse effects.  Patient would like to avoid further medications.  Recommended patient consider gabapentin, and he agreed to do so.  Ordered referral to Neurosurgery with  to discuss options.    /70 in office today.  Patient monitoring at home, and readings improved following epidural steroid injection.  Suspect elevated blood pressure related to back pain.  Continue following with Pharmacy.     HLD   - Stable, continue on  atorvastatin 40mg every day, and ASA 81mg once daily.     CAD   - s/p stent placement, takes ASA 81mg QD.     Lung Nodule   - Chest CT stable 9/22 - 6mm subpleural right lower lobe nodule, calcified walls of gallbladder, and hiatal hernia. 10/2023 repeat showed Interval development of mild tree-in-bud reticulonodular opacities in  the left upper lobe, nonspecific, but most likely related to infectious or inflammatory bronchiolitis. Stable 6 mm subpleural nodule in the right lower lobe.  Patient would like to hold off repeat CT Chest for now.     Coarsened echotecture of liver   - US of gallbladder 4/2021 showed coarsened nodule contour of liver. He does not wish to work this up further.       Painless Nodule of RLE, lateral  - No signs of inflammation.  Decreasing in size per patient.  Monitor for now.  Call the clinic if symptoms persist or worsen, and will order U/S.      Health Maintenance   - Routine labs ordered including CBC, CMP, and a lipid panel to be completed in the fasting state.      Follow up for regular wellness examinations, call sooner if needed.       Scribe Attestation  By signing my name below, I, [facility]   attest that this documentation has been prepared under the direction and in the presence of Edis Figueroa DO.   Viridiana Pratt 04/02/25 10:59 AM

## 2025-04-03 LAB
ALBUMIN SERPL-MCNC: 4.6 G/DL (ref 3.6–5.1)
ALP SERPL-CCNC: 89 U/L (ref 35–144)
ALT SERPL-CCNC: 17 U/L (ref 9–46)
ANION GAP SERPL CALCULATED.4IONS-SCNC: 13 MMOL/L (CALC) (ref 7–17)
AST SERPL-CCNC: 22 U/L (ref 10–35)
BASOPHILS # BLD AUTO: 61 CELLS/UL (ref 0–200)
BASOPHILS NFR BLD AUTO: 1 %
BILIRUB SERPL-MCNC: 0.7 MG/DL (ref 0.2–1.2)
BUN SERPL-MCNC: 18 MG/DL (ref 7–25)
CALCIUM SERPL-MCNC: 9.5 MG/DL (ref 8.6–10.3)
CHLORIDE SERPL-SCNC: 100 MMOL/L (ref 98–110)
CHOLEST SERPL-MCNC: 128 MG/DL
CHOLEST/HDLC SERPL: 1.6 (CALC)
CO2 SERPL-SCNC: 25 MMOL/L (ref 20–32)
CREAT SERPL-MCNC: 0.79 MG/DL (ref 0.7–1.22)
EGFRCR SERPLBLD CKD-EPI 2021: 84 ML/MIN/1.73M2
EOSINOPHIL # BLD AUTO: 159 CELLS/UL (ref 15–500)
EOSINOPHIL NFR BLD AUTO: 2.6 %
ERYTHROCYTE [DISTWIDTH] IN BLOOD BY AUTOMATED COUNT: 11.9 % (ref 11–15)
GLUCOSE SERPL-MCNC: 87 MG/DL (ref 65–99)
HCT VFR BLD AUTO: 41.8 % (ref 38.5–50)
HDLC SERPL-MCNC: 78 MG/DL
HGB BLD-MCNC: 14 G/DL (ref 13.2–17.1)
LDLC SERPL CALC-MCNC: 30 MG/DL (CALC)
LYMPHOCYTES # BLD AUTO: 1183 CELLS/UL (ref 850–3900)
LYMPHOCYTES NFR BLD AUTO: 19.4 %
MCH RBC QN AUTO: 33.7 PG (ref 27–33)
MCHC RBC AUTO-ENTMCNC: 33.5 G/DL (ref 32–36)
MCV RBC AUTO: 100.7 FL (ref 80–100)
MONOCYTES # BLD AUTO: 854 CELLS/UL (ref 200–950)
MONOCYTES NFR BLD AUTO: 14 %
NEUTROPHILS # BLD AUTO: 3843 CELLS/UL (ref 1500–7800)
NEUTROPHILS NFR BLD AUTO: 63 %
NONHDLC SERPL-MCNC: 50 MG/DL (CALC)
PLATELET # BLD AUTO: 223 THOUSAND/UL (ref 140–400)
PMV BLD REES-ECKER: 11 FL (ref 7.5–12.5)
POTASSIUM SERPL-SCNC: 4.3 MMOL/L (ref 3.5–5.3)
PROT SERPL-MCNC: 6.9 G/DL (ref 6.1–8.1)
RBC # BLD AUTO: 4.15 MILLION/UL (ref 4.2–5.8)
SODIUM SERPL-SCNC: 138 MMOL/L (ref 135–146)
TRIGL SERPL-MCNC: 122 MG/DL
WBC # BLD AUTO: 6.1 THOUSAND/UL (ref 3.8–10.8)

## 2025-04-09 ENCOUNTER — APPOINTMENT (OUTPATIENT)
Dept: NEUROSURGERY | Facility: CLINIC | Age: OVER 89
End: 2025-04-09
Payer: MEDICARE

## 2025-04-09 VITALS
DIASTOLIC BLOOD PRESSURE: 82 MMHG | WEIGHT: 153 LBS | BODY MASS INDEX: 24.59 KG/M2 | TEMPERATURE: 98.8 F | HEIGHT: 66 IN | SYSTOLIC BLOOD PRESSURE: 130 MMHG | HEART RATE: 69 BPM

## 2025-04-09 DIAGNOSIS — M48.062 LUMBAR STENOSIS WITH NEUROGENIC CLAUDICATION: Primary | ICD-10-CM

## 2025-04-09 PROCEDURE — 99204 OFFICE O/P NEW MOD 45 MIN: CPT | Performed by: PHYSICIAN ASSISTANT

## 2025-04-09 PROCEDURE — 3079F DIAST BP 80-89 MM HG: CPT | Performed by: PHYSICIAN ASSISTANT

## 2025-04-09 PROCEDURE — 1036F TOBACCO NON-USER: CPT | Performed by: PHYSICIAN ASSISTANT

## 2025-04-09 PROCEDURE — 1125F AMNT PAIN NOTED PAIN PRSNT: CPT | Performed by: PHYSICIAN ASSISTANT

## 2025-04-09 PROCEDURE — 1159F MED LIST DOCD IN RCRD: CPT | Performed by: PHYSICIAN ASSISTANT

## 2025-04-09 PROCEDURE — 3075F SYST BP GE 130 - 139MM HG: CPT | Performed by: PHYSICIAN ASSISTANT

## 2025-04-09 ASSESSMENT — PATIENT HEALTH QUESTIONNAIRE - PHQ9
2. FEELING DOWN, DEPRESSED OR HOPELESS: NOT AT ALL
SUM OF ALL RESPONSES TO PHQ9 QUESTIONS 1 & 2: 0
1. LITTLE INTEREST OR PLEASURE IN DOING THINGS: NOT AT ALL

## 2025-04-09 ASSESSMENT — PAIN SCALES - GENERAL: PAINLEVEL_OUTOF10: 3

## 2025-04-09 NOTE — PROGRESS NOTES
[facility]  Department of Neurological Surgery  New Patient Visit    History of Present Illness:  Ant Seaman is a 89 y.o. year old male who presents to the spine clinic with bilateral leg weakness is main endorsement though also with low back pain and bilateral sciatica. Radiating pain is treated well with injections via Dr. Rodriguez though weakness continues.  He states this has been ongoing for 5 years and has previous visit with Dr. Lemus offering L3-5 decompression and fusion.  He did not follow through with this as he was concerned with the length of surgery necessary and recovery process.  During the interim time nothing has provided improvement and he continues to utilize ibuprofen and Tylenol nightly for pain relief.  He is still able to continue through his workout regimen including 40-60 steps for exercise.  He ambulates with a without a wheeled walker depending on stability.  Continues rated symptoms at 3/10.  Prior MRI and CT scan show lumbar stenosis most significant L4-5 associated with spondylolisthesis and spondylosis.  He had progressed through MILD procedure which also did provide improvement in pain relief though no increase in strength.        Prior Spine Surgeries: MILD    Physical Therapy: prior  Diabetic:      Osteoporosis: osteopenia left femur, spine normal on last DEXA    Patient's BMI is Body mass index is 24.69 kg/m².    14/14 systems reviewed and negative other than what is listed in the history of present illness    Patient Active Problem List   Diagnosis    CAD S/P percutaneous coronary angioplasty    Benign essential hypertension    Bilateral sensorineural hearing loss    BPH with obstruction/lower urinary tract symptoms    Carotid artery plaque    Cervical disc disease    Gallbladder anomaly    H/O heart artery stent    Hyperlipidemia    Left-sided weakness    Lower extremity weakness    Lumbar pain    Lumbar spondylosis    Lumbar stenosis    Lung  nodule    Renal cyst    TIA (transient ischemic attack)    Tubular adenoma of colon    Lumbar compression fracture (Multi)    Overweight with body mass index (BMI) of 26 to 26.9 in adult    Paresis of lower extremity     Past Medical History:   Diagnosis Date    Diverticulosis of intestine, part unspecified, without perforation or abscess without bleeding 11/30/2012    Diverticulosis    Encounter for screening for malignant neoplasm of colon 05/23/2016    Encounter for screening colonoscopy    Unspecified abdominal hernia without obstruction or gangrene 04/09/2014    Hernia     Past Surgical History:   Procedure Laterality Date    BLADDER SURGERY  11/30/2012    Bladder Surgery    COLONOSCOPY  11/30/2012    Complete Colonoscopy    CORONARY ANGIOPLASTY WITH STENT PLACEMENT  08/16/2013    Cath Stent Placement    HERNIA REPAIR  11/30/2012    Hernia Repair     Social History     Tobacco Use    Smoking status: Never    Smokeless tobacco: Never   Substance Use Topics    Alcohol use: Never     family history includes Heart failure in his father; Lung cancer (age of onset: 69) in his brother.    Current Outpatient Medications:     aspirin 81 mg EC tablet, Take 1 tablet (81 mg) by mouth once daily., Disp: , Rfl:     atorvastatin (Lipitor) 40 mg tablet, Take 1 tablet (40 mg) by mouth once daily at bedtime., Disp: 90 tablet, Rfl: 2    multivit-iron-minerals-folic acid (Centrum Silver) 0.4 mg-300 mcg- 250 mcg tab, Take by mouth., Disp: , Rfl:   No Known Allergies    Physical Examination    General: Well developed, awake/alert/oriented x3, no distress, alert and cooperative  Skin: Warm and dry, no lesions, no rashes  ENMT: Mucous membranes moist, no apparent injury, no lesions seen  Head/Neck: Neck Supple, no apparent injury  Respiratory/Thorax: Normal breath sounds with good chest expansion, thorax symmetric  Cardiovascular: No pitting edema, no JVD    Motor Strength: 4/5 bilateral hip extension, knee flexion, otherwise 5/5  Throughout all extremities    Muscle Bulk: Normal and symmetric in all extremities    Posture:   -- Cervical: Normal  -- Thoracic: Normal  -- Lumbar : Normal  Paraspinal muscle spasm/tenderness absent.   Midline tenderness absent    Sensation: intact to light touch       Results    I personally reviewed and interpreted the imaging results which included lumbar stenosis L4-5 on CT and MRI secondary to facet arthropathy and spondylolisthesis    Assessment and Plan:  Ant Seaman is a 89 y.o. year old male who presents to the spine clinic with bilateral leg weakness is main endorsement though also with low back pain and bilateral sciatica. Radiating pain is treated well with injections via Dr. Rodriguez though weakness continues.  He states this has been ongoing for 5 years and has previous visit with Dr. Lemus offering L3-5 decompression and fusion.  He did not follow through with this as he was concerned with the length of surgery necessary and recovery process.  During the interim time nothing has provided improvement and he continues to utilize ibuprofen and Tylenol nightly for pain relief.  He is still able to continue through his workout regimen including 40-60 steps for exercise.  He ambulates with a without a wheeled walker depending on stability.  Continues rated symptoms at 3/10.  Prior MRI and CT scan show lumbar stenosis most significant L4-5 associated with spondylolisthesis and spondylosis.  He had progressed through MILD procedure which also did provide improvement in pain relief though no increase in strength.    Patient states that his mother lived to 106 and his father to 102 years old and he is overall in excellent health for a 89-year-old.  Will have him update MRI and CT scan as well as obtain flexion-extension x-rays and follow-up with attending neurosurgeon for possible operative options.     46 minutes spend in review, evaluation, discussion and documentation of care.     I have reviewed all  prior documentation and reviewed the electronic medical record since admission. I have personally have reviewed all advanced imaging not just the reports and used my interpretation as documented as the relevant findings. I have reviewed the risks and benefits of all treatment recommendations listed in this note with the patient and family.       The above clinical summary has been dictated with voice recognition software. It has not been proofread for grammatical errors, typographical mistakes, or other semantic inconsistencies.    Thank you for visiting our office today. It was our pleasure to take part in your healthcare.     Do not hesitate to call with any questions regarding your plan of care after leaving at [phone] M-F 8am-4pm.     To clinicians, thank you very much for this kind referral. It is a privilege to partner with you in the care of your patients. My office would be delighted to assist you with any further consultations or with questions regarding the plan of care outlined. Do not hesitate to call the office or contact me directly.       Sincerely,      AMBER Mcelroy, PA-C  Associate Physician Assistant, Neurosurgery  Clinical   [facility] [place] [address]    Phone: [phone]  Fax: [phone]

## 2025-04-23 ENCOUNTER — HOSPITAL ENCOUNTER (OUTPATIENT)
Dept: RADIOLOGY | Facility: CLINIC | Age: OVER 89
Discharge: HOME | End: 2025-04-23
Payer: MEDICARE

## 2025-04-23 DIAGNOSIS — M48.062 LUMBAR STENOSIS WITH NEUROGENIC CLAUDICATION: ICD-10-CM

## 2025-04-23 PROCEDURE — 72131 CT LUMBAR SPINE W/O DYE: CPT

## 2025-04-23 PROCEDURE — 72120 X-RAY BEND ONLY L-S SPINE: CPT

## 2025-04-23 PROCEDURE — 72114 X-RAY EXAM L-S SPINE BENDING: CPT | Performed by: RADIOLOGY

## 2025-04-23 PROCEDURE — 72148 MRI LUMBAR SPINE W/O DYE: CPT

## 2025-07-10 ENCOUNTER — OFFICE VISIT (OUTPATIENT)
Facility: CLINIC | Age: OVER 89
End: 2025-07-10
Payer: MEDICARE

## 2025-07-10 VITALS
BODY MASS INDEX: 24.91 KG/M2 | SYSTOLIC BLOOD PRESSURE: 132 MMHG | TEMPERATURE: 97.4 F | HEART RATE: 45 BPM | DIASTOLIC BLOOD PRESSURE: 82 MMHG | HEIGHT: 66 IN | WEIGHT: 155 LBS

## 2025-07-10 DIAGNOSIS — M48.062 NEUROGENIC CLAUDICATION DUE TO LUMBAR SPINAL STENOSIS: Primary | ICD-10-CM

## 2025-07-10 PROCEDURE — 3079F DIAST BP 80-89 MM HG: CPT | Performed by: STUDENT IN AN ORGANIZED HEALTH CARE EDUCATION/TRAINING PROGRAM

## 2025-07-10 PROCEDURE — 99214 OFFICE O/P EST MOD 30 MIN: CPT | Performed by: STUDENT IN AN ORGANIZED HEALTH CARE EDUCATION/TRAINING PROGRAM

## 2025-07-10 PROCEDURE — 1126F AMNT PAIN NOTED NONE PRSNT: CPT | Performed by: STUDENT IN AN ORGANIZED HEALTH CARE EDUCATION/TRAINING PROGRAM

## 2025-07-10 PROCEDURE — 1036F TOBACCO NON-USER: CPT | Performed by: STUDENT IN AN ORGANIZED HEALTH CARE EDUCATION/TRAINING PROGRAM

## 2025-07-10 PROCEDURE — 3075F SYST BP GE 130 - 139MM HG: CPT | Performed by: STUDENT IN AN ORGANIZED HEALTH CARE EDUCATION/TRAINING PROGRAM

## 2025-07-10 PROCEDURE — 1159F MED LIST DOCD IN RCRD: CPT | Performed by: STUDENT IN AN ORGANIZED HEALTH CARE EDUCATION/TRAINING PROGRAM

## 2025-07-10 ASSESSMENT — PAIN SCALES - GENERAL: PAINLEVEL_OUTOF10: 0-NO PAIN

## 2025-07-10 NOTE — PROGRESS NOTES
[facility] [place]  Department of Neurological Surgery  New Patient Visit    History of Present Illness:  Ant Seaman is a 89 y.o. year old male who presents to the spine clinic with continued low back pain and BLE weakness. Has seen pain management (Dr. Rodriguez) as well as prior spine surgeon with Dr. Lemus of [facility] who offered L3-L5 decompression and fusion.     Pain radiates into legs when patient is supine. Otherwise generally speaking main symptoms include subjective weakness in legs and difficulty with endurance.    Uses NSAIDs/tylenol for pain relief.    Osteoporosis: bone density on CT LS within the L3 and L5 vertebral bodies is approximately 45-65 hounsfield units.    T score from femoral head on 2023 DEXA is T-score of -1.2   No DXA results found for the past 12 months    Patient's BMI is Body mass index is 25.02 kg/m².    Review of Systems:  14/14 systems reviewed and negative other than what is listed in the history of present illness    Problem List[1]  Medical History[2]  Surgical History[3]  Social History     Tobacco Use    Smoking status: Never    Smokeless tobacco: Never   Substance Use Topics    Alcohol use: Never     family history includes Heart failure in his father; Lung cancer (age of onset: 69) in his brother.  Current Medications[4]  Allergies[5]    Physical Examination    General: Well developed, awake/alert/oriented x3, no distress, alert and cooperative  Skin: Warm and dry, no lesions, no rashes  ENMT: Mucous membranes moist, no apparent injury, no lesions seen  Head/Neck: Neck Supple, no apparent injury  Respiratory/Thorax: Normal breath sounds with good chest expansion, thorax symmetric  Cardiovascular: No pitting edema, no JVD    Motor Strength: 5/5 Throughout all extremities    Muscle Bulk: Normal and symmetric in all extremities    Posture:   -- Cervical: Normal  -- Thoracic: Normal  -- Lumbar : Normal  Paraspinal muscle spasm/tenderness absent.      Sensation: intact to light touch      Results    I personally reviewed and interpreted the imaging results which included MRI lumbar spine and flexion/extension XR of lumbar spine.    The patient has L4-5 grade 1 spondylolisthesis which is minimally mobile on flexion/extension X-rays which causes severe foraminal compression        Assessment and Plan:    Ant Seaman is a 89 y.o. year old male who presents to the spine clinic with lumbar stenosis and resultant claudication symptoms from a symptomatic L4-5 immobile spondylolisthesis.    I have personally reviewed and interpreted the imaging and detailed diagnosis with the patient, discussed the natural history of their disease and both non-operative and operative treatments available and rationale and the risks for all options.    The patient’s clinical symptoms correlates well with the radiological findings. Patient has been having significant functional impairment with decreased ability to perform her normal activities of daily living. They have tried treatment options including medications (NSAIDs/narcotics/muscle relaxants/membrane stabilizers), formal physical therapy, and injections.    I offered the option of surgery that would consist of a L4-5 laminectomy.    I have explained the surgical procedure in detail with expected duration and extent of recovery along risks of surgery that include, but is not limited to bleeding, infection, blood vessel injury or damage, loss of sensation, loss of bladder, bowel or sexual function, nerve injury/damage resulting in weakness/paralysis, malunion, nonunion, CSF leak, brachial plexus injury, peripheral vision blindness, failure of implants/fusion, failure to relieve symptoms, recurrent disease, adjacent segment disease, need to reoperate for any reason and general anesthesia reaction such as stroke, coma, heart attack, delirium, confusion, death as well as worsening of preexisted medical conditions.    I  clearly emphasized that while the goal of surgery is to decompress the spinal cord so as to ARREST the progression of neurological deficits - preexisting deficits may or may not improve after surgery. We discussed that many patients do clinically improve in functional and neurological outcomes following decompression of the spinal elements in patients with lumbar degenerative disease or radiculopathy the extent of which is variable and depends on the severity of pain, numbness, tingling, or weakness. With improvement seen of those symptoms in that order. We did discuss the goal of surgery to alleviate pain first and foremost and hope for recovery of all neurologic function with time.    All questions were answered and the patient left satisfied with the surgical plan moving forward.         I have reviewed all prior documentation and reviewed the electronic medical record since admission. I have personally have reviewed all advanced imaging not just the reports and used my interpretation as documented as the relevant findings. I have reviewed the risks and benefits of all treatment recommendations listed in this note with the patient and family.       The above clinical summary has been dictated with voice recognition software. It has not been proofread for grammatical errors, typographical mistakes, or other semantic inconsistencies.    Thank you for visiting our office today. It was our pleasure to take part in your healthcare.     Do not hesitate to call with any questions regarding your plan of care after leaving at [phone] M-F 8am-4pm.     To clinicians, thank you very much for this kind referral. It is a privilege to partner with you in the care of your patients. My office would be delighted to assist you with any further consultations or with questions regarding the plan of care outlined. Do not hesitate to call the office or contact me directly.       Sincerely,      Kemar Kirk MD, FAANS  Spine  , [facility]  Sameer Alex Chair in Spinal Neurosurgery  Complex Spine Surgery Fellowship Director   of Neurological Surgery  [facility] [employer]  Phone: [phone]  Fax: [phone]        Scribe Attestation  By signing my name below, I, Edith Balluyen , [place]   attest that this documentation has been prepared under the direction and in the presence of Kemar Kirk MD.          [1]   Patient Active Problem List  Diagnosis    CAD S/P percutaneous coronary angioplasty    Benign essential hypertension    Bilateral sensorineural hearing loss    BPH with obstruction/lower urinary tract symptoms    Carotid artery plaque    Cervical disc disease    Gallbladder anomaly    H/O heart artery stent    Hyperlipidemia    Left-sided weakness    Lower extremity weakness    Lumbar pain    Lumbar spondylosis    Lumbar stenosis    Lung nodule    Renal cyst    TIA (transient ischemic attack)    Tubular adenoma of colon    Lumbar compression fracture (Multi)    Overweight with body mass index (BMI) of 26 to 26.9 in adult    Paresis of lower extremity   [2]   Past Medical History:  Diagnosis Date    Diverticulosis of intestine, part unspecified, without perforation or abscess without bleeding 11/30/2012    Diverticulosis    Encounter for screening for malignant neoplasm of colon 05/23/2016    Encounter for screening colonoscopy    Unspecified abdominal hernia without obstruction or gangrene 04/09/2014    Hernia   [3]   Past Surgical History:  Procedure Laterality Date    BLADDER SURGERY  11/30/2012    Bladder Surgery    COLONOSCOPY  11/30/2012    Complete Colonoscopy    CORONARY ANGIOPLASTY WITH STENT PLACEMENT  08/16/2013    Cath Stent Placement    HERNIA REPAIR  11/30/2012    Hernia Repair   [4]   Current Outpatient Medications:     aspirin 81 mg EC tablet, Take 1 tablet (81 mg) by mouth once daily., Disp: , Rfl:      atorvastatin (Lipitor) 40 mg tablet, Take 1 tablet (40 mg) by mouth once daily at bedtime., Disp: 90 tablet, Rfl: 2    multivit-iron-minerals-folic acid (Centrum Silver) 0.4 mg-300 mcg- 250 mcg tab, Take by mouth., Disp: , Rfl:   [5] No Known Allergies

## 2025-07-24 ENCOUNTER — PATIENT MESSAGE (OUTPATIENT)
Dept: PRIMARY CARE | Facility: CLINIC | Age: OVER 89
End: 2025-07-24
Payer: MEDICARE

## 2025-08-13 DIAGNOSIS — R79.1 ABNORMAL COAGULATION PROFILE: ICD-10-CM

## 2025-08-13 DIAGNOSIS — M48.062 SPINAL STENOSIS, LUMBAR REGION WITH NEUROGENIC CLAUDICATION: Primary | ICD-10-CM

## 2025-08-13 DIAGNOSIS — R79.81 ABNORMAL BLOOD-GAS LEVEL: ICD-10-CM

## 2025-08-13 RX ORDER — CELECOXIB 400 MG/1
400 CAPSULE ORAL ONCE
OUTPATIENT
Start: 2025-08-13 | End: 2025-08-13

## 2025-08-13 RX ORDER — ACETAMINOPHEN 325 MG/1
975 TABLET ORAL ONCE
OUTPATIENT
Start: 2025-08-13 | End: 2025-08-13

## 2025-08-13 RX ORDER — TRANEXAMIC ACID 650 MG/1
1300 TABLET ORAL ONCE
OUTPATIENT
Start: 2025-08-13 | End: 2025-08-13

## 2025-10-08 ENCOUNTER — APPOINTMENT (OUTPATIENT)
Dept: PRIMARY CARE | Facility: CLINIC | Age: OVER 89
End: 2025-10-08
Payer: MEDICARE